# Patient Record
Sex: FEMALE | Race: ASIAN | NOT HISPANIC OR LATINO
[De-identification: names, ages, dates, MRNs, and addresses within clinical notes are randomized per-mention and may not be internally consistent; named-entity substitution may affect disease eponyms.]

---

## 2017-01-20 ENCOUNTER — APPOINTMENT (OUTPATIENT)
Dept: INTERNAL MEDICINE | Facility: CLINIC | Age: 47
End: 2017-01-20

## 2017-01-20 VITALS
WEIGHT: 173 LBS | TEMPERATURE: 98.6 F | HEART RATE: 98 BPM | DIASTOLIC BLOOD PRESSURE: 78 MMHG | BODY MASS INDEX: 30.65 KG/M2 | RESPIRATION RATE: 16 BRPM | HEIGHT: 63 IN | SYSTOLIC BLOOD PRESSURE: 130 MMHG

## 2017-02-05 ENCOUNTER — RX RENEWAL (OUTPATIENT)
Age: 47
End: 2017-02-05

## 2017-02-05 LAB
25(OH)D3 SERPL-MCNC: 16.9 NG/ML
ALBUMIN SERPL ELPH-MCNC: 4.5 G/DL
ALP BLD-CCNC: 62 U/L
ALT SERPL-CCNC: 24 U/L
ANION GAP SERPL CALC-SCNC: 16 MMOL/L
APPEARANCE: CLEAR
AST SERPL-CCNC: 20 U/L
BASOPHILS # BLD AUTO: 0.05 K/UL
BASOPHILS NFR BLD AUTO: 0.7 %
BILIRUB SERPL-MCNC: 0.3 MG/DL
BILIRUBIN URINE: NEGATIVE
BLOOD URINE: NEGATIVE
BUN SERPL-MCNC: 7 MG/DL
CALCIUM SERPL-MCNC: 10 MG/DL
CHLORIDE SERPL-SCNC: 94 MMOL/L
CHOLEST SERPL-MCNC: 181 MG/DL
CHOLEST/HDLC SERPL: 5.5 RATIO
CO2 SERPL-SCNC: 23 MMOL/L
COLOR: YELLOW
CREAT SERPL-MCNC: 0.53 MG/DL
CREAT SPEC-SCNC: 59 MG/DL
EOSINOPHIL # BLD AUTO: 0.67 K/UL
EOSINOPHIL NFR BLD AUTO: 8.8 %
ERYTHROCYTE [SEDIMENTATION RATE] IN BLOOD BY WESTERGREN METHOD: 30 MM/HR
FOLATE SERPL-MCNC: 10.9 NG/ML
GGT SERPL-CCNC: 30 U/L
GLUCOSE QUALITATIVE U: 1000 MG/DL
GLUCOSE SERPL-MCNC: 313 MG/DL
HBA1C MFR BLD HPLC: 12.2 %
HCT VFR BLD CALC: 42.6 %
HDLC SERPL-MCNC: 33 MG/DL
HGB BLD-MCNC: 13.2 G/DL
IMM GRANULOCYTES NFR BLD AUTO: 0.1 %
IRON SATN MFR SERPL: 25 %
IRON SERPL-MCNC: 102 UG/DL
KETONES URINE: ABNORMAL
LDLC SERPL CALC-MCNC: 101 MG/DL
LEUKOCYTE ESTERASE URINE: NEGATIVE
LYMPHOCYTES # BLD AUTO: 2.7 K/UL
LYMPHOCYTES NFR BLD AUTO: 35.4 %
MAN DIFF?: NORMAL
MCHC RBC-ENTMCNC: 26.9 PG
MCHC RBC-ENTMCNC: 31 GM/DL
MCV RBC AUTO: 86.9 FL
MICROALBUMIN 24H UR DL<=1MG/L-MCNC: 3.5 MG/DL
MICROALBUMIN/CREAT 24H UR-RTO: 60 UG/MG
MONOCYTES # BLD AUTO: 0.29 K/UL
MONOCYTES NFR BLD AUTO: 3.8 %
NEUTROPHILS # BLD AUTO: 3.9 K/UL
NEUTROPHILS NFR BLD AUTO: 51.2 %
NITRITE URINE: NEGATIVE
PH URINE: 5.5
PLATELET # BLD AUTO: 371 K/UL
POTASSIUM SERPL-SCNC: 4.7 MMOL/L
PROT SERPL-MCNC: 7.3 G/DL
PROTEIN URINE: NEGATIVE MG/DL
RBC # BLD: 4.9 M/UL
RBC # FLD: 12.6 %
SODIUM SERPL-SCNC: 133 MMOL/L
SPECIFIC GRAVITY URINE: 1.03
T3 SERPL-MCNC: 116 NG/DL
T4 FREE SERPL-MCNC: 1.2 NG/DL
TIBC SERPL-MCNC: 410 UG/DL
TRIGL SERPL-MCNC: 235 MG/DL
TSH SERPL-ACNC: 2.25 UIU/ML
UIBC SERPL-MCNC: 308 UG/DL
UROBILINOGEN URINE: NORMAL MG/DL
VIT B12 SERPL-MCNC: >2000 PG/ML
WBC # FLD AUTO: 7.62 K/UL

## 2017-03-22 ENCOUNTER — RX RENEWAL (OUTPATIENT)
Age: 47
End: 2017-03-22

## 2017-04-28 ENCOUNTER — RX RENEWAL (OUTPATIENT)
Age: 47
End: 2017-04-28

## 2017-04-30 ENCOUNTER — RX RENEWAL (OUTPATIENT)
Age: 47
End: 2017-04-30

## 2017-05-27 ENCOUNTER — APPOINTMENT (OUTPATIENT)
Dept: INTERNAL MEDICINE | Facility: CLINIC | Age: 47
End: 2017-05-27

## 2017-05-27 VITALS
SYSTOLIC BLOOD PRESSURE: 130 MMHG | HEIGHT: 62.5 IN | OXYGEN SATURATION: 98 % | DIASTOLIC BLOOD PRESSURE: 80 MMHG | RESPIRATION RATE: 19 BRPM | WEIGHT: 173 LBS | BODY MASS INDEX: 31.04 KG/M2 | HEART RATE: 89 BPM | TEMPERATURE: 97.6 F

## 2017-05-27 RX ORDER — AMOXICILLIN 500 MG/1
500 TABLET, FILM COATED ORAL
Qty: 21 | Refills: 0 | Status: DISCONTINUED | COMMUNITY
Start: 2017-05-15 | End: 2017-05-27

## 2017-08-21 ENCOUNTER — RX RENEWAL (OUTPATIENT)
Age: 47
End: 2017-08-21

## 2017-09-16 ENCOUNTER — APPOINTMENT (OUTPATIENT)
Dept: INTERNAL MEDICINE | Facility: CLINIC | Age: 47
End: 2017-09-16

## 2017-11-30 PROBLEM — Z86.2 HISTORY OF IRON DEFICIENCY ANEMIA: Status: RESOLVED | Noted: 2017-11-30 | Resolved: 2017-11-30

## 2017-12-01 ENCOUNTER — APPOINTMENT (OUTPATIENT)
Dept: INTERNAL MEDICINE | Facility: CLINIC | Age: 47
End: 2017-12-01

## 2017-12-01 DIAGNOSIS — Z86.2 PERSONAL HISTORY OF DISEASES OF THE BLOOD AND BLOOD-FORMING ORGANS AND CERTAIN DISORDERS INVOLVING THE IMMUNE MECHANISM: ICD-10-CM

## 2018-01-24 ENCOUNTER — RX RENEWAL (OUTPATIENT)
Age: 48
End: 2018-01-24

## 2018-03-29 ENCOUNTER — RX RENEWAL (OUTPATIENT)
Age: 48
End: 2018-03-29

## 2018-06-07 ENCOUNTER — APPOINTMENT (OUTPATIENT)
Dept: INTERNAL MEDICINE | Facility: CLINIC | Age: 48
End: 2018-06-07
Payer: COMMERCIAL

## 2018-06-07 VITALS
SYSTOLIC BLOOD PRESSURE: 120 MMHG | RESPIRATION RATE: 16 BRPM | OXYGEN SATURATION: 98 % | HEART RATE: 94 BPM | TEMPERATURE: 98.4 F | BODY MASS INDEX: 30.55 KG/M2 | HEIGHT: 62 IN | DIASTOLIC BLOOD PRESSURE: 80 MMHG | WEIGHT: 166 LBS

## 2018-06-07 PROCEDURE — 99214 OFFICE O/P EST MOD 30 MIN: CPT

## 2018-06-07 NOTE — PHYSICAL EXAM
[Comprehensive Foot Exam Normal] : Right and left foot were examined and both feet are normal. No ulcers in either foot. Toes are normal and with full ROM.  Normal tactile sensation with monofilament testing throughout both feet

## 2018-06-07 NOTE — HEALTH RISK ASSESSMENT
[No falls in past year] : Patient reported no falls in the past year [0] : 2) Feeling down, depressed, or hopeless: Not at all (0) [Discussed at today's visit] : Advance Directives Discussed at today's visit [Fully functional (bathing, dressing, toileting, transferring, walking, feeding)] : Fully functional (bathing, dressing, toileting, transferring, walking, feeding) [Fully functional (using the telephone, shopping, preparing meals, housekeeping, doing laundry, using] : Fully functional and needs no help or supervision to perform IADLs (using the telephone, shopping, preparing meals, housekeeping, doing laundry, using transportation, managing medications and managing finances) [] : No [de-identified] : None [[id number]] : 0

## 2018-06-07 NOTE — ASSESSMENT
[FreeTextEntry1] : 48 year old female found to have stable Essential Hypertension, Type 2 Diabetes Mellitus with hyperglycemia,  Iron Deficiency Anemia, Cervical Radiculopathy, Vitamin B12 Deficiency,with the current regimen, diet and life style modifications, as counseled. Prior results reviewed and discussed with the patient during today's examination. Plan as ordered.\par Patient was recommended to follow up with GYN for routine examination, PAP smear and Mammogram, reports will be provided, when ready.\par

## 2018-06-07 NOTE — HISTORY OF PRESENT ILLNESS
[Weight Loss ___ Pounds] : Weight loss of [unfilled] pounds [___ # of attempts] : [unfilled] weight lost attempts [Following Diet Successfully] : Following the diet successfully [___ times per week] : Patient exercises [unfilled] times per week [Following  treatment as advised] : Patient is following  treatment as advised [Action] : Action: patient is following a plan to lose weight [Good understanding] : Patient has a good understanding of disease, goals and obesity follow-up plan [de-identified] : 48 year old  female patient with history of stable Essential Hypertension, Type 2 Diabetes Mellitus with hyperglycemia,  Iron Deficiency Anemia, Cervical Radiculopathy, Vitamin B12 Deficiency, history as stated, presented for follow up examination of Elevated BP/Sugar checked. Patient is compliant with all medications. Denies shortness of breath, chest pain or abdominal pains at this time. ROS as stated.\par

## 2018-06-09 ENCOUNTER — LABORATORY RESULT (OUTPATIENT)
Age: 48
End: 2018-06-09

## 2018-06-10 LAB
25(OH)D3 SERPL-MCNC: 10.3 NG/ML
ALBUMIN SERPL ELPH-MCNC: 4.2 G/DL
ALP BLD-CCNC: 58 U/L
ALT SERPL-CCNC: 34 U/L
ANION GAP SERPL CALC-SCNC: 16 MMOL/L
APPEARANCE: CLEAR
AST SERPL-CCNC: 37 U/L
BASOPHILS # BLD AUTO: 0.02 K/UL
BASOPHILS NFR BLD AUTO: 0.3 %
BILIRUB SERPL-MCNC: 0.3 MG/DL
BILIRUBIN URINE: NEGATIVE
BLOOD URINE: NEGATIVE
BUN SERPL-MCNC: 7 MG/DL
CALCIUM SERPL-MCNC: 9.7 MG/DL
CHLORIDE SERPL-SCNC: 97 MMOL/L
CHOLEST SERPL-MCNC: 176 MG/DL
CHOLEST/HDLC SERPL: 5 RATIO
CO2 SERPL-SCNC: 21 MMOL/L
COLOR: YELLOW
CREAT SERPL-MCNC: 0.61 MG/DL
CREAT SPEC-SCNC: 38 MG/DL
EOSINOPHIL # BLD AUTO: 0.15 K/UL
EOSINOPHIL NFR BLD AUTO: 2.5 %
ERYTHROCYTE [SEDIMENTATION RATE] IN BLOOD BY WESTERGREN METHOD: 25 MM/HR
FOLATE SERPL-MCNC: 15.3 NG/ML
GGT SERPL-CCNC: 34 U/L
GLUCOSE QUALITATIVE U: 1000 MG/DL
GLUCOSE SERPL-MCNC: 257 MG/DL
HBA1C MFR BLD HPLC: 12.3 %
HCT VFR BLD CALC: 42 %
HDLC SERPL-MCNC: 35 MG/DL
HGB BLD-MCNC: 13 G/DL
IMM GRANULOCYTES NFR BLD AUTO: 0.2 %
IRON SATN MFR SERPL: 22 %
IRON SERPL-MCNC: 88 UG/DL
KETONES URINE: NEGATIVE
LDLC SERPL CALC-MCNC: 101 MG/DL
LEUKOCYTE ESTERASE URINE: ABNORMAL
LYMPHOCYTES # BLD AUTO: 2.35 K/UL
LYMPHOCYTES NFR BLD AUTO: 38.7 %
MAN DIFF?: NORMAL
MCHC RBC-ENTMCNC: 27 PG
MCHC RBC-ENTMCNC: 31 GM/DL
MCV RBC AUTO: 87.3 FL
MICROALBUMIN 24H UR DL<=1MG/L-MCNC: 2.8 MG/DL
MICROALBUMIN/CREAT 24H UR-RTO: 74 MG/G
MONOCYTES # BLD AUTO: 0.28 K/UL
MONOCYTES NFR BLD AUTO: 4.6 %
NEUTROPHILS # BLD AUTO: 3.26 K/UL
NEUTROPHILS NFR BLD AUTO: 53.7 %
NITRITE URINE: NEGATIVE
PH URINE: 5
PLATELET # BLD AUTO: 355 K/UL
POTASSIUM SERPL-SCNC: 5.2 MMOL/L
PROT SERPL-MCNC: 7.9 G/DL
PROTEIN URINE: NEGATIVE MG/DL
RBC # BLD: 4.81 M/UL
RBC # FLD: 12.9 %
SODIUM SERPL-SCNC: 134 MMOL/L
SPECIFIC GRAVITY URINE: 1.02
T3 SERPL-MCNC: 133 NG/DL
T4 FREE SERPL-MCNC: 1.4 NG/DL
TIBC SERPL-MCNC: 396 UG/DL
TRIGL SERPL-MCNC: 199 MG/DL
TSH SERPL-ACNC: 1.74 UIU/ML
UIBC SERPL-MCNC: 308 UG/DL
UROBILINOGEN URINE: NEGATIVE MG/DL
VIT B12 SERPL-MCNC: 1011 PG/ML
WBC # FLD AUTO: 6.07 K/UL

## 2018-08-13 ENCOUNTER — TRANSCRIPTION ENCOUNTER (OUTPATIENT)
Age: 48
End: 2018-08-13

## 2018-09-15 ENCOUNTER — APPOINTMENT (OUTPATIENT)
Dept: INTERNAL MEDICINE | Facility: CLINIC | Age: 48
End: 2018-09-15
Payer: COMMERCIAL

## 2018-09-15 VITALS
TEMPERATURE: 98 F | BODY MASS INDEX: 30.55 KG/M2 | RESPIRATION RATE: 16 BRPM | OXYGEN SATURATION: 96 % | DIASTOLIC BLOOD PRESSURE: 78 MMHG | HEIGHT: 62 IN | WEIGHT: 166 LBS | SYSTOLIC BLOOD PRESSURE: 120 MMHG | HEART RATE: 85 BPM

## 2018-09-15 PROCEDURE — 99214 OFFICE O/P EST MOD 30 MIN: CPT

## 2018-09-15 NOTE — HEALTH RISK ASSESSMENT
[No falls in past year] : Patient reported no falls in the past year [0] : 2) Feeling down, depressed, or hopeless: Not at all (0) [Patient declined mammogram] : Patient declined mammogram [Patient declined PAP Smear] : Patient declined PAP Smear [HIV test declined] : HIV test declined [] : No [de-identified] : None [[id number]] : 0

## 2018-09-15 NOTE — ASSESSMENT
[FreeTextEntry1] : 48 year old female found to have stable  Essential Hypertension, Type 2 Diabetes Mellitus with hyperglycemia, Cervical Radiculopathy,  Iron Deficiency Anemia, Vitamin D Deficiency, Vitamin B12 Deficiency,with the current regimen, diet and life style modifications, as counseled. Prior results reviewed and discussed with the patient during today's examination. Plan as ordered.\par

## 2018-09-15 NOTE — HISTORY OF PRESENT ILLNESS
[Patient declined Retinal Exam] : Patient declined Retinal Exam. [de-identified] : 48 year old  female patient with history of stable Essential Hypertension, Type 2 Diabetes Mellitus with hyperglycemia, Cervical Radiculopathy,  Iron Deficiency Anemia, Vitamin D Deficiency, Vitamin B12 Deficiency, history as stated, presented for follow up examination of Elevated BP/Sugar checked. Patient is compliant with all medications. Denies shortness of breath, chest pain or abdominal pains at this time. ROS as stated.\par

## 2018-09-23 ENCOUNTER — CLINICAL ADVICE (OUTPATIENT)
Age: 48
End: 2018-09-23

## 2018-09-23 LAB
ALBUMIN SERPL ELPH-MCNC: 4.1 G/DL
ALP BLD-CCNC: 60 U/L
ALT SERPL-CCNC: 26 U/L
ANION GAP SERPL CALC-SCNC: 16 MMOL/L
AST SERPL-CCNC: 21 U/L
BASOPHILS # BLD AUTO: 0.04 K/UL
BASOPHILS NFR BLD AUTO: 0.5 %
BILIRUB SERPL-MCNC: 0.2 MG/DL
BUN SERPL-MCNC: 7 MG/DL
CALCIUM SERPL-MCNC: 10.2 MG/DL
CHLORIDE SERPL-SCNC: 94 MMOL/L
CHOLEST SERPL-MCNC: 177 MG/DL
CHOLEST/HDLC SERPL: 5.9 RATIO
CO2 SERPL-SCNC: 24 MMOL/L
CREAT SERPL-MCNC: 0.59 MG/DL
EOSINOPHIL # BLD AUTO: 0.19 K/UL
EOSINOPHIL NFR BLD AUTO: 2.4 %
FOLATE SERPL-MCNC: 9.4 NG/ML
GGT SERPL-CCNC: 29 U/L
GLUCOSE SERPL-MCNC: 306 MG/DL
HBA1C MFR BLD HPLC: 12.9 %
HCT VFR BLD CALC: 41.1 %
HDLC SERPL-MCNC: 30 MG/DL
HGB BLD-MCNC: 12.9 G/DL
IMM GRANULOCYTES NFR BLD AUTO: 0.1 %
IRON SATN MFR SERPL: 13 %
IRON SERPL-MCNC: 55 UG/DL
LDLC SERPL CALC-MCNC: 94 MG/DL
LYMPHOCYTES # BLD AUTO: 2.93 K/UL
LYMPHOCYTES NFR BLD AUTO: 37.5 %
MAN DIFF?: NORMAL
MCHC RBC-ENTMCNC: 27.2 PG
MCHC RBC-ENTMCNC: 31.4 GM/DL
MCV RBC AUTO: 86.5 FL
MONOCYTES # BLD AUTO: 0.35 K/UL
MONOCYTES NFR BLD AUTO: 4.5 %
NEUTROPHILS # BLD AUTO: 4.29 K/UL
NEUTROPHILS NFR BLD AUTO: 55 %
PLATELET # BLD AUTO: 369 K/UL
POTASSIUM SERPL-SCNC: 4.9 MMOL/L
PROT SERPL-MCNC: 7.9 G/DL
RBC # BLD: 4.75 M/UL
RBC # FLD: 13.2 %
SODIUM SERPL-SCNC: 134 MMOL/L
TIBC SERPL-MCNC: 414 UG/DL
TRIGL SERPL-MCNC: 267 MG/DL
UIBC SERPL-MCNC: 359 UG/DL
VIT B12 SERPL-MCNC: 661 PG/ML
WBC # FLD AUTO: 7.81 K/UL

## 2018-12-08 ENCOUNTER — APPOINTMENT (OUTPATIENT)
Dept: INTERNAL MEDICINE | Facility: CLINIC | Age: 48
End: 2018-12-08

## 2019-01-28 ENCOUNTER — RX RENEWAL (OUTPATIENT)
Age: 49
End: 2019-01-28

## 2019-02-23 ENCOUNTER — APPOINTMENT (OUTPATIENT)
Dept: INTERNAL MEDICINE | Facility: CLINIC | Age: 49
End: 2019-02-23
Payer: COMMERCIAL

## 2019-02-23 VITALS
HEART RATE: 88 BPM | TEMPERATURE: 97 F | SYSTOLIC BLOOD PRESSURE: 120 MMHG | RESPIRATION RATE: 16 BRPM | OXYGEN SATURATION: 96 % | DIASTOLIC BLOOD PRESSURE: 80 MMHG | HEIGHT: 62 IN | BODY MASS INDEX: 31.28 KG/M2 | WEIGHT: 170 LBS

## 2019-02-23 PROCEDURE — 99214 OFFICE O/P EST MOD 30 MIN: CPT

## 2019-02-23 NOTE — HISTORY OF PRESENT ILLNESS
[de-identified] : 48 year old  female patient with history of stable Essential Hypertension, Type 2 Diabetes Mellitus with hyperglycemia,  Iron Deficiency Anemia, Cervical Radiculopathy, Allergic Dermatitis, Vitamin B12 Deficiency, Vitamin D Deficiency, Microalbuminuria, history as stated, presented for follow up examination of Elevated BP/Sugar checked. Patient is compliant with all medications. Denies shortness of breath, chest pain or abdominal pains at this time. ROS as stated.\par

## 2019-02-23 NOTE — ASSESSMENT
[FreeTextEntry1] : 48 year old female found to have stable Essential Hypertension, Type 2 Diabetes Mellitus with hyperglycemia,  Iron Deficiency Anemia, Cervical Radiculopathy, Allergic Dermatitis, Vitamin B12 Deficiency, Vitamin D Deficiency, Microalbuminuria,with the current regimen, diet and life style modifications, as counseled. Prior results reviewed and discussed with the patient during today's examination. Plan as ordered.\par Patient is refusing all immunizations, in spite of counseling risks and benefits.\par

## 2019-02-25 ENCOUNTER — APPOINTMENT (OUTPATIENT)
Dept: ENDOCRINOLOGY | Facility: CLINIC | Age: 49
End: 2019-02-25

## 2019-04-23 ENCOUNTER — TRANSCRIPTION ENCOUNTER (OUTPATIENT)
Age: 49
End: 2019-04-23

## 2019-05-19 ENCOUNTER — RX RENEWAL (OUTPATIENT)
Age: 49
End: 2019-05-19

## 2019-05-19 RX ORDER — ERGOCALCIFEROL 1.25 MG/1
1.25 MG CAPSULE, LIQUID FILLED ORAL
Qty: 12 | Refills: 3 | Status: ACTIVE | COMMUNITY
Start: 2018-06-10 | End: 1900-01-01

## 2019-05-25 ENCOUNTER — RX RENEWAL (OUTPATIENT)
Age: 49
End: 2019-05-25

## 2019-06-05 ENCOUNTER — APPOINTMENT (OUTPATIENT)
Dept: ENDOCRINOLOGY | Facility: CLINIC | Age: 49
End: 2019-06-05

## 2019-06-06 ENCOUNTER — MOBILE ON CALL (OUTPATIENT)
Age: 49
End: 2019-06-06

## 2019-06-08 ENCOUNTER — NON-APPOINTMENT (OUTPATIENT)
Age: 49
End: 2019-06-08

## 2019-06-08 ENCOUNTER — APPOINTMENT (OUTPATIENT)
Dept: INTERNAL MEDICINE | Facility: CLINIC | Age: 49
End: 2019-06-08
Payer: COMMERCIAL

## 2019-06-08 ENCOUNTER — LABORATORY RESULT (OUTPATIENT)
Age: 49
End: 2019-06-08

## 2019-06-08 VITALS
HEIGHT: 62 IN | SYSTOLIC BLOOD PRESSURE: 130 MMHG | OXYGEN SATURATION: 99 % | DIASTOLIC BLOOD PRESSURE: 80 MMHG | RESPIRATION RATE: 16 BRPM | HEART RATE: 86 BPM | WEIGHT: 164 LBS | TEMPERATURE: 98 F | BODY MASS INDEX: 30.18 KG/M2

## 2019-06-08 PROCEDURE — 93000 ELECTROCARDIOGRAM COMPLETE: CPT

## 2019-06-08 PROCEDURE — 99214 OFFICE O/P EST MOD 30 MIN: CPT | Mod: 25

## 2019-06-08 NOTE — HISTORY OF PRESENT ILLNESS
[de-identified] : 49 year old  female patient with history of stable Essential Hypertension, Type 2 Diabetes Mellitus with hyperglycemia,  Iron Deficiency Anemia, Cervical Radiculopathy, Allergic Dermatitis, Vitamin B12 Deficiency, Vitamin D Deficiency, Microalbuminuria, history as stated, presented for follow up examination of Elevated BP/Sugar checked. Patient is compliant with all medications. Denies shortness of breath, chest pain or abdominal pains at this time. ROS as stated.\par

## 2019-06-08 NOTE — HEALTH RISK ASSESSMENT
[No falls in past year] : Patient reported no falls in the past year [0] : 2) Feeling down, depressed, or hopeless: Not at all (0) [Patient declined colonoscopy] : Patient declined colonoscopy [de-identified] : None [] : No [[id number]] : 0

## 2019-06-08 NOTE — ASSESSMENT
[FreeTextEntry1] : 49 year old female found to have stable Essential Hypertension, Type 2 Diabetes Mellitus with hyperglycemia,  Iron Deficiency Anemia, Cervical Radiculopathy, Allergic Dermatitis, Vitamin B12 Deficiency, Vitamin D Deficiency, Microalbuminuria,with the current regimen, diet and life style modifications, as counseled. Prior results reviewed and discussed with the patient during today's examination. Plan as ordered.\par EKG showed NSR at the rate of 85 BPM, LAD, non-sp ST-T changes noted.\par

## 2019-06-09 LAB
25(OH)D3 SERPL-MCNC: 20.1 NG/ML
ALBUMIN SERPL ELPH-MCNC: 4.4 G/DL
ALP BLD-CCNC: 63 U/L
ALT SERPL-CCNC: 20 U/L
ANION GAP SERPL CALC-SCNC: 16 MMOL/L
APPEARANCE: ABNORMAL
AST SERPL-CCNC: 19 U/L
BASOPHILS # BLD AUTO: 0.04 K/UL
BASOPHILS NFR BLD AUTO: 0.5 %
BILIRUB SERPL-MCNC: 0.2 MG/DL
BILIRUBIN URINE: NEGATIVE
BLOOD URINE: ABNORMAL
BUN SERPL-MCNC: 8 MG/DL
CALCIUM SERPL-MCNC: 10.3 MG/DL
CHLORIDE SERPL-SCNC: 97 MMOL/L
CHOLEST SERPL-MCNC: 164 MG/DL
CHOLEST/HDLC SERPL: 5.1 RATIO
CO2 SERPL-SCNC: 22 MMOL/L
COLOR: YELLOW
CREAT SERPL-MCNC: 0.43 MG/DL
CREAT SPEC-SCNC: 97 MG/DL
EOSINOPHIL # BLD AUTO: 0.22 K/UL
EOSINOPHIL NFR BLD AUTO: 2.8 %
ERYTHROCYTE [SEDIMENTATION RATE] IN BLOOD BY WESTERGREN METHOD: 21 MM/HR
ESTIMATED AVERAGE GLUCOSE: 309 MG/DL
FOLATE SERPL-MCNC: 11.8 NG/ML
GGT SERPL-CCNC: 26 U/L
GLUCOSE QUALITATIVE U: ABNORMAL
GLUCOSE SERPL-MCNC: 289 MG/DL
HBA1C MFR BLD HPLC: 12.4 %
HCT VFR BLD CALC: 43.2 %
HDLC SERPL-MCNC: 32 MG/DL
HGB BLD-MCNC: 13.3 G/DL
IMM GRANULOCYTES NFR BLD AUTO: 0.3 %
IRON SATN MFR SERPL: 15 %
IRON SERPL-MCNC: 67 UG/DL
KETONES URINE: NORMAL
LDLC SERPL CALC-MCNC: 86 MG/DL
LEUKOCYTE ESTERASE URINE: ABNORMAL
LYMPHOCYTES # BLD AUTO: 3.2 K/UL
LYMPHOCYTES NFR BLD AUTO: 40.2 %
MAN DIFF?: NORMAL
MCHC RBC-ENTMCNC: 26.8 PG
MCHC RBC-ENTMCNC: 30.8 GM/DL
MCV RBC AUTO: 86.9 FL
MICROALBUMIN 24H UR DL<=1MG/L-MCNC: 7.3 MG/DL
MICROALBUMIN/CREAT 24H UR-RTO: 75 MG/G
MONOCYTES # BLD AUTO: 0.31 K/UL
MONOCYTES NFR BLD AUTO: 3.9 %
NEUTROPHILS # BLD AUTO: 4.17 K/UL
NEUTROPHILS NFR BLD AUTO: 52.3 %
NITRITE URINE: POSITIVE
PH URINE: 5.5
PLATELET # BLD AUTO: 397 K/UL
POTASSIUM SERPL-SCNC: 4.6 MMOL/L
PROT SERPL-MCNC: 7.6 G/DL
PROTEIN URINE: ABNORMAL
RBC # BLD: 4.97 M/UL
RBC # FLD: 12.5 %
SODIUM SERPL-SCNC: 135 MMOL/L
SPECIFIC GRAVITY URINE: 1.02
T3 SERPL-MCNC: 121 NG/DL
T4 FREE SERPL-MCNC: 1.2 NG/DL
TIBC SERPL-MCNC: 436 UG/DL
TRIGL SERPL-MCNC: 230 MG/DL
TSH SERPL-ACNC: 2.37 UIU/ML
UIBC SERPL-MCNC: 369 UG/DL
UROBILINOGEN URINE: NORMAL
VIT B12 SERPL-MCNC: 800 PG/ML
WBC # FLD AUTO: 7.96 K/UL

## 2019-10-26 ENCOUNTER — APPOINTMENT (OUTPATIENT)
Dept: INTERNAL MEDICINE | Facility: CLINIC | Age: 49
End: 2019-10-26
Payer: COMMERCIAL

## 2019-10-26 VITALS
HEART RATE: 85 BPM | DIASTOLIC BLOOD PRESSURE: 80 MMHG | HEIGHT: 62 IN | OXYGEN SATURATION: 95 % | RESPIRATION RATE: 16 BRPM | SYSTOLIC BLOOD PRESSURE: 120 MMHG | BODY MASS INDEX: 30.36 KG/M2 | WEIGHT: 165 LBS | TEMPERATURE: 98 F

## 2019-10-26 PROCEDURE — 99214 OFFICE O/P EST MOD 30 MIN: CPT

## 2019-10-26 NOTE — HISTORY OF PRESENT ILLNESS
[Patient declined Retinal Exam] : Patient declined Retinal Exam. [de-identified] : 49 year old  female patient with history of stable Essential Hypertension, Type 2 Diabetes Mellitus with hyperglycemia,  Iron Deficiency Anemia, Cervical Radiculopathy, Allergic Dermatitis, Vitamin B12 Deficiency, Vitamin D Deficiency, Microalbuminuria, history as stated, presented for follow up examination. Patient is compliant with all medications. Denies shortness of breath, chest pain or abdominal pains at this time. ROS as stated.\par

## 2019-10-26 NOTE — ASSESSMENT
[FreeTextEntry1] : 49 year old female found to have stable Essential Hypertension, Type 2 Diabetes Mellitus with hyperglycemia,  Iron Deficiency Anemia, Cervical Radiculopathy, Allergic Dermatitis, Vitamin B12 Deficiency, Vitamin D Deficiency, Microalbuminuria,with the current regimen, diet and life style modifications, as counseled. Prior results reviewed and discussed with the patient during today's examination. Plan as ordered.\par

## 2019-10-26 NOTE — HEALTH RISK ASSESSMENT
[No falls in past year] : Patient reported no falls in the past year [No] : No [0] : 2) Feeling down, depressed, or hopeless: Not at all (0) [] : No [de-identified] : None [[id number]] : 0

## 2019-11-23 ENCOUNTER — RX RENEWAL (OUTPATIENT)
Age: 49
End: 2019-11-23

## 2020-02-22 ENCOUNTER — APPOINTMENT (OUTPATIENT)
Dept: INTERNAL MEDICINE | Facility: CLINIC | Age: 50
End: 2020-02-22
Payer: COMMERCIAL

## 2020-02-22 VITALS
SYSTOLIC BLOOD PRESSURE: 134 MMHG | BODY MASS INDEX: 30.36 KG/M2 | RESPIRATION RATE: 16 BRPM | WEIGHT: 165 LBS | TEMPERATURE: 97.9 F | DIASTOLIC BLOOD PRESSURE: 84 MMHG | HEART RATE: 81 BPM | HEIGHT: 62 IN | OXYGEN SATURATION: 97 %

## 2020-02-22 PROCEDURE — 99214 OFFICE O/P EST MOD 30 MIN: CPT

## 2020-02-22 NOTE — HEALTH RISK ASSESSMENT
[No] : No [No falls in past year] : Patient reported no falls in the past year [0] : 2) Feeling down, depressed, or hopeless: Not at all (0) [Patient declined colonoscopy] : Patient declined colonoscopy [de-identified] : None [] : No [[id number]] : 0

## 2020-02-22 NOTE — HISTORY OF PRESENT ILLNESS
[de-identified] : 49 year old  female patient with history of stable Essential Hypertension, Type 2 Diabetes Mellitus with hyperglycemia,  Iron Deficiency Anemia, Cervical Radiculopathy, Allergic Dermatitis, Vitamin B12 Deficiency, Vitamin D Deficiency, Microalbuminuria, history as stated, presented for follow up examination. Patient is compliant with all medications. Denies shortness of breath, chest pain or abdominal pains at this time. ROS as stated.\par

## 2020-02-24 LAB
ALBUMIN SERPL ELPH-MCNC: 4.3 G/DL
ALP BLD-CCNC: 59 U/L
ALT SERPL-CCNC: 19 U/L
ANION GAP SERPL CALC-SCNC: 14 MMOL/L
AST SERPL-CCNC: 15 U/L
BASOPHILS # BLD AUTO: 0.06 K/UL
BASOPHILS NFR BLD AUTO: 0.8 %
BILIRUB SERPL-MCNC: <0.2 MG/DL
BUN SERPL-MCNC: 5 MG/DL
CALCIUM SERPL-MCNC: 9.5 MG/DL
CHLORIDE SERPL-SCNC: 102 MMOL/L
CHOLEST SERPL-MCNC: 168 MG/DL
CHOLEST/HDLC SERPL: 5.8 RATIO
CO2 SERPL-SCNC: 22 MMOL/L
CREAT SERPL-MCNC: 0.4 MG/DL
EOSINOPHIL # BLD AUTO: 0.16 K/UL
EOSINOPHIL NFR BLD AUTO: 2.2 %
ESTIMATED AVERAGE GLUCOSE: 324 MG/DL
GGT SERPL-CCNC: 23 U/L
GLUCOSE SERPL-MCNC: 289 MG/DL
HBA1C MFR BLD HPLC: 12.9 %
HCT VFR BLD CALC: 41.6 %
HDLC SERPL-MCNC: 29 MG/DL
HGB BLD-MCNC: 12.9 G/DL
IMM GRANULOCYTES NFR BLD AUTO: 0.1 %
LDLC SERPL CALC-MCNC: 83 MG/DL
LYMPHOCYTES # BLD AUTO: 2.92 K/UL
LYMPHOCYTES NFR BLD AUTO: 40.4 %
MAN DIFF?: NORMAL
MCHC RBC-ENTMCNC: 26.8 PG
MCHC RBC-ENTMCNC: 31 GM/DL
MCV RBC AUTO: 86.3 FL
MONOCYTES # BLD AUTO: 0.36 K/UL
MONOCYTES NFR BLD AUTO: 5 %
NEUTROPHILS # BLD AUTO: 3.71 K/UL
NEUTROPHILS NFR BLD AUTO: 51.5 %
PLATELET # BLD AUTO: 367 K/UL
POTASSIUM SERPL-SCNC: 4.8 MMOL/L
PROT SERPL-MCNC: 7.1 G/DL
RBC # BLD: 4.82 M/UL
RBC # FLD: 13.2 %
SODIUM SERPL-SCNC: 138 MMOL/L
TRIGL SERPL-MCNC: 283 MG/DL
WBC # FLD AUTO: 7.22 K/UL

## 2020-06-13 ENCOUNTER — APPOINTMENT (OUTPATIENT)
Dept: INTERNAL MEDICINE | Facility: CLINIC | Age: 50
End: 2020-06-13
Payer: COMMERCIAL

## 2020-06-13 ENCOUNTER — LABORATORY RESULT (OUTPATIENT)
Age: 50
End: 2020-06-13

## 2020-06-13 VITALS
SYSTOLIC BLOOD PRESSURE: 123 MMHG | OXYGEN SATURATION: 96 % | RESPIRATION RATE: 16 BRPM | HEART RATE: 84 BPM | BODY MASS INDEX: 29.44 KG/M2 | HEIGHT: 62 IN | TEMPERATURE: 97.9 F | WEIGHT: 160 LBS | DIASTOLIC BLOOD PRESSURE: 83 MMHG

## 2020-06-13 PROCEDURE — 99214 OFFICE O/P EST MOD 30 MIN: CPT

## 2020-06-13 NOTE — HEALTH RISK ASSESSMENT
[No] : In the past 12 months have you used drugs other than those required for medical reasons? No [No falls in past year] : Patient reported no falls in the past year [0] : 2) Feeling down, depressed, or hopeless: Not at all (0) [] : No [de-identified] : None [[id number]] : 0

## 2020-06-13 NOTE — ASSESSMENT
[FreeTextEntry1] : 50 year old female found to have stable Essential Hypertension, Type 2 Diabetes Mellitus with hyperglycemia, Iron Deficiency Anemia, Cervical Radiculopathy, Allergic Dermatitis, Vitamin B12 Deficiency, Vitamin D Deficiency, Microalbuminuria,with the current regimen, diet and life style modifications, as counseled. Prior results reviewed and discussed with the patient during today's examination. Plan as ordered.\par

## 2020-06-13 NOTE — HISTORY OF PRESENT ILLNESS
[de-identified] : 50 year old  female patient with history of stable Essential Hypertension, Type 2 Diabetes Mellitus with hyperglycemia,  Iron Deficiency Anemia, Cervical Radiculopathy, Allergic Dermatitis, Vitamin B12 Deficiency, Vitamin D Deficiency, Microalbuminuria, history as stated, presented for follow up examination. Patient is compliant with all medications. Denies shortness of breath, chest pain or abdominal pains at this time. ROS as stated.\par

## 2020-06-14 LAB
25(OH)D3 SERPL-MCNC: 16.1 NG/ML
ALBUMIN SERPL ELPH-MCNC: 4.5 G/DL
ALP BLD-CCNC: 68 U/L
ALT SERPL-CCNC: 24 U/L
ANION GAP SERPL CALC-SCNC: 17 MMOL/L
APPEARANCE: ABNORMAL
AST SERPL-CCNC: 24 U/L
BASOPHILS # BLD AUTO: 0.06 K/UL
BASOPHILS NFR BLD AUTO: 0.8 %
BILIRUB SERPL-MCNC: 0.3 MG/DL
BILIRUBIN URINE: NEGATIVE
BLOOD URINE: NORMAL
BUN SERPL-MCNC: 7 MG/DL
CALCIUM SERPL-MCNC: 9.9 MG/DL
CHLORIDE SERPL-SCNC: 97 MMOL/L
CHOLEST SERPL-MCNC: 174 MG/DL
CHOLEST/HDLC SERPL: 6 RATIO
CO2 SERPL-SCNC: 22 MMOL/L
COLOR: YELLOW
CREAT SERPL-MCNC: 0.38 MG/DL
CREAT SPEC-SCNC: 85 MG/DL
EOSINOPHIL # BLD AUTO: 0.19 K/UL
EOSINOPHIL NFR BLD AUTO: 2.4 %
ERYTHROCYTE [SEDIMENTATION RATE] IN BLOOD BY WESTERGREN METHOD: 97 MM/HR
ESTIMATED AVERAGE GLUCOSE: 332 MG/DL
FOLATE SERPL-MCNC: 8.5 NG/ML
GGT SERPL-CCNC: 29 U/L
GLUCOSE QUALITATIVE U: ABNORMAL
GLUCOSE SERPL-MCNC: 279 MG/DL
HBA1C MFR BLD HPLC: 13.2 %
HCT VFR BLD CALC: 42.7 %
HDLC SERPL-MCNC: 29 MG/DL
HGB BLD-MCNC: 13.5 G/DL
IMM GRANULOCYTES NFR BLD AUTO: 0.4 %
IRON SATN MFR SERPL: 18 %
IRON SERPL-MCNC: 80 UG/DL
KETONES URINE: ABNORMAL
LDLC SERPL CALC-MCNC: 92 MG/DL
LEUKOCYTE ESTERASE URINE: ABNORMAL
LYMPHOCYTES # BLD AUTO: 3.08 K/UL
LYMPHOCYTES NFR BLD AUTO: 38.6 %
MAN DIFF?: NORMAL
MCHC RBC-ENTMCNC: 26.6 PG
MCHC RBC-ENTMCNC: 31.6 GM/DL
MCV RBC AUTO: 84.1 FL
MICROALBUMIN 24H UR DL<=1MG/L-MCNC: 6.3 MG/DL
MICROALBUMIN/CREAT 24H UR-RTO: 74 MG/G
MONOCYTES # BLD AUTO: 0.43 K/UL
MONOCYTES NFR BLD AUTO: 5.4 %
NEUTROPHILS # BLD AUTO: 4.19 K/UL
NEUTROPHILS NFR BLD AUTO: 52.4 %
NITRITE URINE: POSITIVE
PH URINE: 6
PLATELET # BLD AUTO: 401 K/UL
POTASSIUM SERPL-SCNC: 4.7 MMOL/L
PROT SERPL-MCNC: 7.7 G/DL
PROTEIN URINE: ABNORMAL
RBC # BLD: 5.08 M/UL
RBC # FLD: 13 %
SARS-COV-2 IGG SERPL IA-ACNC: <0.2 RATIO
SARS-COV-2 IGG SERPL QL IA: NEGATIVE
SODIUM SERPL-SCNC: 135 MMOL/L
SPECIFIC GRAVITY URINE: 1.03
T3 SERPL-MCNC: 128 NG/DL
T4 FREE SERPL-MCNC: 1.2 NG/DL
TIBC SERPL-MCNC: 457 UG/DL
TRIGL SERPL-MCNC: 264 MG/DL
TSH SERPL-ACNC: 2.16 UIU/ML
UIBC SERPL-MCNC: 377 UG/DL
UROBILINOGEN URINE: NORMAL
VIT B12 SERPL-MCNC: 360 PG/ML
WBC # FLD AUTO: 7.98 K/UL

## 2020-10-17 ENCOUNTER — APPOINTMENT (OUTPATIENT)
Dept: INTERNAL MEDICINE | Facility: CLINIC | Age: 50
End: 2020-10-17

## 2020-11-16 ENCOUNTER — RX RENEWAL (OUTPATIENT)
Age: 50
End: 2020-11-16

## 2021-01-23 ENCOUNTER — APPOINTMENT (OUTPATIENT)
Dept: INTERNAL MEDICINE | Facility: CLINIC | Age: 51
End: 2021-01-23

## 2021-01-28 ENCOUNTER — NON-APPOINTMENT (OUTPATIENT)
Age: 51
End: 2021-01-28

## 2021-01-28 ENCOUNTER — APPOINTMENT (OUTPATIENT)
Dept: INTERNAL MEDICINE | Facility: CLINIC | Age: 51
End: 2021-01-28
Payer: COMMERCIAL

## 2021-01-28 VITALS
HEIGHT: 62 IN | BODY MASS INDEX: 30.18 KG/M2 | WEIGHT: 164 LBS | HEART RATE: 91 BPM | RESPIRATION RATE: 16 BRPM | SYSTOLIC BLOOD PRESSURE: 166 MMHG | DIASTOLIC BLOOD PRESSURE: 91 MMHG | OXYGEN SATURATION: 98 % | TEMPERATURE: 98.1 F

## 2021-01-28 DIAGNOSIS — E55.9 VITAMIN D DEFICIENCY, UNSPECIFIED: ICD-10-CM

## 2021-01-28 DIAGNOSIS — E53.8 DEFICIENCY OF OTHER SPECIFIED B GROUP VITAMINS: ICD-10-CM

## 2021-01-28 DIAGNOSIS — L23.9 ALLERGIC CONTACT DERMATITIS, UNSPECIFIED CAUSE: ICD-10-CM

## 2021-01-28 PROCEDURE — 99072 ADDL SUPL MATRL&STAF TM PHE: CPT

## 2021-01-28 PROCEDURE — 93000 ELECTROCARDIOGRAM COMPLETE: CPT

## 2021-01-28 PROCEDURE — 99214 OFFICE O/P EST MOD 30 MIN: CPT | Mod: 25

## 2021-01-28 NOTE — HISTORY OF PRESENT ILLNESS
[de-identified] : 50 year old  female patient with history of stable Essential Hypertension, Type 2 Diabetes Mellitus with hyperglycemia, Iron Deficiency Anemia, Cervical Radiculopathy, Allergic Dermatitis, Vitamin B12 Deficiency, Vitamin D Deficiency, Microalbuminuria, history as stated, presented for follow up examination. Patient is compliant with all medications. Denies shortness of breath, chest pain or abdominal pains at this time. ROS as stated.\par

## 2021-01-28 NOTE — HEALTH RISK ASSESSMENT
[No] : In the past 12 months have you used drugs other than those required for medical reasons? No [No falls in past year] : Patient reported no falls in the past year [0] : 2) Feeling down, depressed, or hopeless: Not at all (0) [de-identified] : None [] : No [[id number]] : 0

## 2021-01-28 NOTE — ASSESSMENT
[FreeTextEntry1] : 50 year old female found to have stable Essential Hypertension, Type 2 Diabetes Mellitus with hyperglycemia, Iron Deficiency Anemia, Cervical Radiculopathy, Allergic Dermatitis, Vitamin B12 Deficiency, Vitamin D Deficiency, Microalbuminuria,with the current prescription regimen as recommended, diet and life style modifications, as counseled. Prior results reviewed, interpreted and discussed with the patient during today's examination, as appropriate. Follow up, treatment plan and tests, as ordered.\par EKG showed NSR at the rate of 92 BPM, non-sp ST-T changes noted.\par

## 2021-02-20 ENCOUNTER — LABORATORY RESULT (OUTPATIENT)
Age: 51
End: 2021-02-20

## 2021-02-22 LAB
25(OH)D3 SERPL-MCNC: 25.7 NG/ML
ALBUMIN SERPL ELPH-MCNC: 4.5 G/DL
ALP BLD-CCNC: 65 U/L
ALT SERPL-CCNC: 28 U/L
ANION GAP SERPL CALC-SCNC: 17 MMOL/L
APPEARANCE: ABNORMAL
AST SERPL-CCNC: 21 U/L
BASOPHILS # BLD AUTO: 0.05 K/UL
BASOPHILS NFR BLD AUTO: 0.6 %
BILIRUB SERPL-MCNC: 0.3 MG/DL
BILIRUBIN URINE: NEGATIVE
BLOOD URINE: ABNORMAL
BUN SERPL-MCNC: 7 MG/DL
CALCIUM SERPL-MCNC: 10 MG/DL
CHLORIDE SERPL-SCNC: 96 MMOL/L
CHOLEST SERPL-MCNC: 187 MG/DL
CO2 SERPL-SCNC: 21 MMOL/L
COLOR: YELLOW
CREAT SERPL-MCNC: 0.51 MG/DL
CREAT SPEC-SCNC: 67 MG/DL
EOSINOPHIL # BLD AUTO: 0.16 K/UL
EOSINOPHIL NFR BLD AUTO: 1.9 %
ERYTHROCYTE [SEDIMENTATION RATE] IN BLOOD BY WESTERGREN METHOD: 31 MM/HR
ESTIMATED AVERAGE GLUCOSE: 329 MG/DL
FOLATE SERPL-MCNC: >20 NG/ML
GGT SERPL-CCNC: 31 U/L
GLUCOSE QUALITATIVE U: ABNORMAL
GLUCOSE SERPL-MCNC: 305 MG/DL
HBA1C MFR BLD HPLC: 13.1 %
HCT VFR BLD CALC: 42.7 %
HDLC SERPL-MCNC: 36 MG/DL
HGB BLD-MCNC: 13.2 G/DL
IMM GRANULOCYTES NFR BLD AUTO: 0.2 %
IRON SATN MFR SERPL: 20 %
IRON SERPL-MCNC: 93 UG/DL
KETONES URINE: NORMAL
LDLC SERPL CALC-MCNC: 90 MG/DL
LEUKOCYTE ESTERASE URINE: ABNORMAL
LYMPHOCYTES # BLD AUTO: 3.2 K/UL
LYMPHOCYTES NFR BLD AUTO: 37.6 %
MAN DIFF?: NORMAL
MCHC RBC-ENTMCNC: 26.7 PG
MCHC RBC-ENTMCNC: 30.9 GM/DL
MCV RBC AUTO: 86.4 FL
MICROALBUMIN 24H UR DL<=1MG/L-MCNC: 4.9 MG/DL
MICROALBUMIN/CREAT 24H UR-RTO: 74 MG/G
MONOCYTES # BLD AUTO: 0.41 K/UL
MONOCYTES NFR BLD AUTO: 4.8 %
NEUTROPHILS # BLD AUTO: 4.68 K/UL
NEUTROPHILS NFR BLD AUTO: 54.9 %
NITRITE URINE: POSITIVE
NONHDLC SERPL-MCNC: 151 MG/DL
PH URINE: 5.5
PLATELET # BLD AUTO: 392 K/UL
POTASSIUM SERPL-SCNC: 4.8 MMOL/L
PROT SERPL-MCNC: 7.5 G/DL
PROTEIN URINE: ABNORMAL
RBC # BLD: 4.94 M/UL
RBC # FLD: 13 %
SODIUM SERPL-SCNC: 134 MMOL/L
SPECIFIC GRAVITY URINE: 1.02
T3 SERPL-MCNC: 126 NG/DL
T4 FREE SERPL-MCNC: 1.1 NG/DL
TIBC SERPL-MCNC: 466 UG/DL
TRIGL SERPL-MCNC: 308 MG/DL
TSH SERPL-ACNC: 2.1 UIU/ML
UIBC SERPL-MCNC: 373 UG/DL
UROBILINOGEN URINE: NORMAL
VIT B12 SERPL-MCNC: 438 PG/ML
WBC # FLD AUTO: 8.52 K/UL

## 2021-06-10 ENCOUNTER — APPOINTMENT (OUTPATIENT)
Dept: INTERNAL MEDICINE | Facility: CLINIC | Age: 51
End: 2021-06-10

## 2021-08-08 ENCOUNTER — RX RENEWAL (OUTPATIENT)
Age: 51
End: 2021-08-08

## 2021-09-08 ENCOUNTER — RX RENEWAL (OUTPATIENT)
Age: 51
End: 2021-09-08

## 2021-12-03 ENCOUNTER — RX RENEWAL (OUTPATIENT)
Age: 51
End: 2021-12-03

## 2022-10-31 ENCOUNTER — NON-APPOINTMENT (OUTPATIENT)
Age: 52
End: 2022-10-31

## 2022-10-31 ENCOUNTER — APPOINTMENT (OUTPATIENT)
Dept: INTERNAL MEDICINE | Facility: CLINIC | Age: 52
End: 2022-10-31

## 2022-10-31 VITALS
WEIGHT: 164 LBS | RESPIRATION RATE: 16 BRPM | HEART RATE: 89 BPM | TEMPERATURE: 98 F | SYSTOLIC BLOOD PRESSURE: 147 MMHG | OXYGEN SATURATION: 96 % | DIASTOLIC BLOOD PRESSURE: 81 MMHG | HEIGHT: 62 IN | BODY MASS INDEX: 30.18 KG/M2

## 2022-10-31 PROCEDURE — 93000 ELECTROCARDIOGRAM COMPLETE: CPT

## 2022-10-31 PROCEDURE — 99396 PREV VISIT EST AGE 40-64: CPT | Mod: 25

## 2022-10-31 NOTE — HEALTH RISK ASSESSMENT
[Good] : ~his/her~  mood as  good [Never] : Never [No] : In the past 12 months have you used drugs other than those required for medical reasons? No [No falls in past year] : Patient reported no falls in the past year [0] : 2) Feeling down, depressed, or hopeless: Not at all (0) [Patient declined mammogram] : Patient declined mammogram [Patient declined PAP Smear] : Patient declined PAP Smear [Patient declined colonoscopy] : Patient declined colonoscopy [HIV test declined] : HIV test declined [None] : None [Feels Safe at Home] : Feels safe at home [Fully functional (bathing, dressing, toileting, transferring, walking, feeding)] : Fully functional (bathing, dressing, toileting, transferring, walking, feeding) [Fully functional (using the telephone, shopping, preparing meals, housekeeping, doing laundry, using] : Fully functional and needs no help or supervision to perform IADLs (using the telephone, shopping, preparing meals, housekeeping, doing laundry, using transportation, managing medications and managing finances) [Smoke Detector] : smoke detector [Carbon Monoxide Detector] : carbon monoxide detector [Seat Belt] :  uses seat belt [Sunscreen] : uses sunscreen [With Patient/Caregiver] : , with patient/caregiver [FreeTextEntry1] : Check up\par  [de-identified] : None [[id number]] : 0 [Change in mental status noted] : No change in mental status noted [Reports changes in hearing] : Reports no changes in hearing [Reports changes in vision] : Reports no changes in vision [Reports changes in dental health] : Reports no changes in dental health [AdvancecareDate] : 10/22

## 2022-10-31 NOTE — HISTORY OF PRESENT ILLNESS
[de-identified] : 52 year old female patient with history of stable Essential Hypertension, Type 2 Diabetes Mellitus with hyperglycemia, Iron Deficiency Anemia, Cervical Radiculopathy, Allergic Dermatitis, Vitamin B12 Deficiency, Vitamin D Deficiency, Microalbuminuria, history as stated, presented for an annual preventative examination.\par Patient denies any associated symptoms of shortness of breath, chest pain, abdominal pain at this time.\par

## 2022-10-31 NOTE — ASSESSMENT
[FreeTextEntry1] : 52 year old female found to have stable Essential Hypertension, Type 2 Diabetes Mellitus with hyperglycemia, Iron Deficiency Anemia, Cervical Radiculopathy, Allergic Dermatitis, Vitamin B12 Deficiency, Vitamin D Deficiency, Microalbuminuria,with the current prescription regimen as recommended, diet and life style modifications, as counseled. Prior results reviewed, interpreted and discussed with the patient during today's examination, as appropriate. Follow up, treatment plan and tests, as ordered.\par \par EKG:\par Sinus Rhythm @ 87 BPM.\par Known LAD, no new ST-T changes noted.

## 2022-11-05 LAB
25(OH)D3 SERPL-MCNC: 23.2 NG/ML
ALBUMIN SERPL ELPH-MCNC: 4.4 G/DL
ALP BLD-CCNC: 65 U/L
ALT SERPL-CCNC: 25 U/L
ANION GAP SERPL CALC-SCNC: 13 MMOL/L
APPEARANCE: ABNORMAL
AST SERPL-CCNC: 17 U/L
BACTERIA: ABNORMAL
BASOPHILS # BLD AUTO: 0.05 K/UL
BASOPHILS NFR BLD AUTO: 0.6 %
BILIRUB SERPL-MCNC: 0.3 MG/DL
BILIRUBIN URINE: NEGATIVE
BLOOD URINE: ABNORMAL
BUN SERPL-MCNC: 6 MG/DL
CALCIUM SERPL-MCNC: 10.3 MG/DL
CHLORIDE SERPL-SCNC: 97 MMOL/L
CHOLEST SERPL-MCNC: 177 MG/DL
CO2 SERPL-SCNC: 25 MMOL/L
COLOR: YELLOW
CREAT SERPL-MCNC: 0.45 MG/DL
CREAT SPEC-SCNC: 59 MG/DL
EGFR: 115 ML/MIN/1.73M2
EOSINOPHIL # BLD AUTO: 0.55 K/UL
EOSINOPHIL NFR BLD AUTO: 6.4 %
ESTIMATED AVERAGE GLUCOSE: 326 MG/DL
GGT SERPL-CCNC: 28 U/L
GLUCOSE QUALITATIVE U: ABNORMAL
GLUCOSE SERPL-MCNC: 294 MG/DL
HBA1C MFR BLD HPLC: 13 %
HCT VFR BLD CALC: 40 %
HDLC SERPL-MCNC: 34 MG/DL
HGB BLD-MCNC: 12.8 G/DL
HYALINE CASTS: 0 /LPF
IMM GRANULOCYTES NFR BLD AUTO: 0.2 %
IRON SATN MFR SERPL: 17 %
IRON SERPL-MCNC: 73 UG/DL
KETONES URINE: NORMAL
LDLC SERPL CALC-MCNC: 87 MG/DL
LEUKOCYTE ESTERASE URINE: ABNORMAL
LYMPHOCYTES # BLD AUTO: 3.05 K/UL
LYMPHOCYTES NFR BLD AUTO: 35.5 %
MAN DIFF?: NORMAL
MCHC RBC-ENTMCNC: 27.6 PG
MCHC RBC-ENTMCNC: 32 GM/DL
MCV RBC AUTO: 86.2 FL
MICROALBUMIN 24H UR DL<=1MG/L-MCNC: 11.7 MG/DL
MICROALBUMIN/CREAT 24H UR-RTO: 200 MG/G
MICROSCOPIC-UA: NORMAL
MONOCYTES # BLD AUTO: 0.5 K/UL
MONOCYTES NFR BLD AUTO: 5.8 %
NEUTROPHILS # BLD AUTO: 4.42 K/UL
NEUTROPHILS NFR BLD AUTO: 51.5 %
NITRITE URINE: POSITIVE
NONHDLC SERPL-MCNC: 143 MG/DL
PH URINE: 6
PLATELET # BLD AUTO: 368 K/UL
POTASSIUM SERPL-SCNC: 5.1 MMOL/L
PROT SERPL-MCNC: 7.5 G/DL
PROTEIN URINE: ABNORMAL
RBC # BLD: 4.64 M/UL
RBC # FLD: 12.8 %
RED BLOOD CELLS URINE: 10 /HPF
SODIUM SERPL-SCNC: 135 MMOL/L
SPECIFIC GRAVITY URINE: 1.02
SQUAMOUS EPITHELIAL CELLS: 3 /HPF
TIBC SERPL-MCNC: 436 UG/DL
TRIGL SERPL-MCNC: 280 MG/DL
TSH SERPL-ACNC: 2.54 UIU/ML
UIBC SERPL-MCNC: 363 UG/DL
UROBILINOGEN URINE: NORMAL
VIT B12 SERPL-MCNC: 535 PG/ML
WBC # FLD AUTO: 8.59 K/UL
WHITE BLOOD CELLS URINE: >720 /HPF

## 2022-11-30 ENCOUNTER — APPOINTMENT (OUTPATIENT)
Dept: OBGYN | Facility: CLINIC | Age: 52
End: 2022-11-30

## 2022-11-30 VITALS
HEART RATE: 91 BPM | SYSTOLIC BLOOD PRESSURE: 144 MMHG | HEIGHT: 62 IN | DIASTOLIC BLOOD PRESSURE: 84 MMHG | OXYGEN SATURATION: 98 % | TEMPERATURE: 97.5 F | WEIGHT: 162 LBS | BODY MASS INDEX: 29.81 KG/M2

## 2022-11-30 DIAGNOSIS — N89.8 OTHER SPECIFIED NONINFLAMMATORY DISORDERS OF VAGINA: ICD-10-CM

## 2022-11-30 DIAGNOSIS — L29.2 PRURITUS VULVAE: ICD-10-CM

## 2022-11-30 DIAGNOSIS — B37.31 ACUTE CANDIDIASIS OF VULVA AND VAGINA: ICD-10-CM

## 2022-11-30 PROCEDURE — 99204 OFFICE O/P NEW MOD 45 MIN: CPT

## 2022-11-30 RX ORDER — FLUCONAZOLE 150 MG/1
150 TABLET ORAL
Qty: 3 | Refills: 1 | Status: ACTIVE | COMMUNITY
Start: 2022-11-30 | End: 1900-01-01

## 2022-11-30 RX ORDER — CLOTRIMAZOLE AND BETAMETHASONE DIPROPIONATE 10; .5 MG/G; MG/G
1-0.05 CREAM TOPICAL TWICE DAILY
Qty: 1 | Refills: 1 | Status: ACTIVE | COMMUNITY
Start: 2022-11-30 | End: 1900-01-01

## 2022-11-30 NOTE — PHYSICAL EXAM
[Chaperone Present] : A chaperone was present in the examining room during all aspects of the physical examination [Appropriately responsive] : appropriately responsive [Alert] : alert [No Acute Distress] : no acute distress [No Lymphadenopathy] : no lymphadenopathy [Regular Rate Rhythm] : regular rate rhythm [No Murmurs] : no murmurs [Clear to Auscultation B/L] : clear to auscultation bilaterally [Soft] : soft [Non-tender] : non-tender [Non-distended] : non-distended [No HSM] : No HSM [No Lesions] : no lesions [No Mass] : no mass [Oriented x3] : oriented x3 [Examination Of The Breasts] : a normal appearance [Normal] : normal [No Masses] : no breast masses were palpable [Location: ___] : [unfilled]

## 2022-11-30 NOTE — HISTORY OF PRESENT ILLNESS
[Normal Amount/Duration] :  normal amount and duration [Regular Cycle Intervals] : periods have been regular [Menarche Age: ____] : age at menarche was [unfilled] [Menopause Age: ____] : age at menopause was [unfilled] [FreeTextEntry1] : 45yrs [Previously active] : previously active [Men] : men [Vaginal] : vaginal [No] : No

## 2022-12-01 LAB — HPV HIGH+LOW RISK DNA PNL CVX: NOT DETECTED

## 2022-12-09 LAB — CYTOLOGY CVX/VAG DOC THIN PREP: ABNORMAL

## 2023-01-26 ENCOUNTER — NON-APPOINTMENT (OUTPATIENT)
Age: 53
End: 2023-01-26

## 2023-01-30 ENCOUNTER — APPOINTMENT (OUTPATIENT)
Dept: ORTHOPEDIC SURGERY | Facility: CLINIC | Age: 53
End: 2023-01-30
Payer: MEDICAID

## 2023-01-30 VITALS
WEIGHT: 162 LBS | HEIGHT: 62 IN | DIASTOLIC BLOOD PRESSURE: 82 MMHG | HEART RATE: 110 BPM | SYSTOLIC BLOOD PRESSURE: 144 MMHG | BODY MASS INDEX: 29.81 KG/M2

## 2023-01-30 DIAGNOSIS — S82.831A OTHER FRACTURE OF UPPER AND LOWER END OF RIGHT FIBULA, INITIAL ENCOUNTER FOR CLOSED FRACTURE: ICD-10-CM

## 2023-01-30 PROCEDURE — 73610 X-RAY EXAM OF ANKLE: CPT | Mod: RT

## 2023-01-30 PROCEDURE — 99204 OFFICE O/P NEW MOD 45 MIN: CPT

## 2023-02-01 ENCOUNTER — TRANSCRIPTION ENCOUNTER (OUTPATIENT)
Age: 53
End: 2023-02-01

## 2023-02-01 ENCOUNTER — OUTPATIENT (OUTPATIENT)
Dept: OUTPATIENT SERVICES | Facility: HOSPITAL | Age: 53
LOS: 1 days | End: 2023-02-01
Payer: MEDICAID

## 2023-02-01 VITALS
HEART RATE: 89 BPM | SYSTOLIC BLOOD PRESSURE: 122 MMHG | RESPIRATION RATE: 16 BRPM | TEMPERATURE: 98 F | HEIGHT: 63 IN | WEIGHT: 158.07 LBS | DIASTOLIC BLOOD PRESSURE: 79 MMHG | OXYGEN SATURATION: 99 %

## 2023-02-01 DIAGNOSIS — S82.831A OTHER FRACTURE OF UPPER AND LOWER END OF RIGHT FIBULA, INITIAL ENCOUNTER FOR CLOSED FRACTURE: ICD-10-CM

## 2023-02-01 DIAGNOSIS — Z11.52 ENCOUNTER FOR SCREENING FOR COVID-19: ICD-10-CM

## 2023-02-01 DIAGNOSIS — E11.9 TYPE 2 DIABETES MELLITUS WITHOUT COMPLICATIONS: ICD-10-CM

## 2023-02-01 DIAGNOSIS — Z01.818 ENCOUNTER FOR OTHER PREPROCEDURAL EXAMINATION: ICD-10-CM

## 2023-02-01 DIAGNOSIS — I10 ESSENTIAL (PRIMARY) HYPERTENSION: ICD-10-CM

## 2023-02-01 LAB
A1C WITH ESTIMATED AVERAGE GLUCOSE RESULT: 12.4 % — HIGH (ref 4–5.6)
ANION GAP SERPL CALC-SCNC: 14 MMOL/L — SIGNIFICANT CHANGE UP (ref 5–17)
BUN SERPL-MCNC: 8 MG/DL — SIGNIFICANT CHANGE UP (ref 7–23)
CALCIUM SERPL-MCNC: 10.2 MG/DL — SIGNIFICANT CHANGE UP (ref 8.4–10.5)
CHLORIDE SERPL-SCNC: 96 MMOL/L — SIGNIFICANT CHANGE UP (ref 96–108)
CO2 SERPL-SCNC: 24 MMOL/L — SIGNIFICANT CHANGE UP (ref 22–31)
CREAT SERPL-MCNC: 0.39 MG/DL — LOW (ref 0.5–1.3)
EGFR: 119 ML/MIN/1.73M2 — SIGNIFICANT CHANGE UP
ESTIMATED AVERAGE GLUCOSE: 309 MG/DL — HIGH (ref 68–114)
GLUCOSE SERPL-MCNC: 330 MG/DL — HIGH (ref 70–99)
HCT VFR BLD CALC: 40.5 % — SIGNIFICANT CHANGE UP (ref 34.5–45)
HGB BLD-MCNC: 12.8 G/DL — SIGNIFICANT CHANGE UP (ref 11.5–15.5)
MCHC RBC-ENTMCNC: 27.1 PG — SIGNIFICANT CHANGE UP (ref 27–34)
MCHC RBC-ENTMCNC: 31.6 GM/DL — LOW (ref 32–36)
MCV RBC AUTO: 85.6 FL — SIGNIFICANT CHANGE UP (ref 80–100)
NRBC # BLD: 0 /100 WBCS — SIGNIFICANT CHANGE UP (ref 0–0)
PLATELET # BLD AUTO: 424 K/UL — HIGH (ref 150–400)
POTASSIUM SERPL-MCNC: 4.8 MMOL/L — SIGNIFICANT CHANGE UP (ref 3.5–5.3)
POTASSIUM SERPL-SCNC: 4.8 MMOL/L — SIGNIFICANT CHANGE UP (ref 3.5–5.3)
RBC # BLD: 4.73 M/UL — SIGNIFICANT CHANGE UP (ref 3.8–5.2)
RBC # FLD: 12.4 % — SIGNIFICANT CHANGE UP (ref 10.3–14.5)
SARS-COV-2 RNA SPEC QL NAA+PROBE: SIGNIFICANT CHANGE UP
SODIUM SERPL-SCNC: 134 MMOL/L — LOW (ref 135–145)
WBC # BLD: 11.46 K/UL — HIGH (ref 3.8–10.5)
WBC # FLD AUTO: 11.46 K/UL — HIGH (ref 3.8–10.5)

## 2023-02-01 PROCEDURE — 85027 COMPLETE CBC AUTOMATED: CPT

## 2023-02-01 PROCEDURE — C9803: CPT

## 2023-02-01 PROCEDURE — U0003: CPT

## 2023-02-01 PROCEDURE — 80048 BASIC METABOLIC PNL TOTAL CA: CPT

## 2023-02-01 PROCEDURE — U0005: CPT

## 2023-02-01 PROCEDURE — G0463: CPT

## 2023-02-01 PROCEDURE — 83036 HEMOGLOBIN GLYCOSYLATED A1C: CPT

## 2023-02-01 NOTE — H&P PST ADULT - ASSESSMENT
Dentures: no dentures, no loose teeth    Mallampati     DASI METS Dentures: no dentures, no loose teeth    Mallampati 2    DASI 7.04 METS - Can walk 2-3 blocks, climb 2 fights of stairs. No chest pain or dyspnea.

## 2023-02-01 NOTE — H&P PST ADULT - PROBLEM SELECTOR PLAN 1
Scheduled for Open reduction and Internal Fixation Right Distal Fibula   PST Labs: cbc, bmp, A1c   DOS: Fingerstick     *Covid swab performed today at [facility] 02/01/2023

## 2023-02-01 NOTE — H&P PST ADULT - NSICDXPASTMEDICALHX_GEN_ALL_CORE_FT
PAST MEDICAL HISTORY:  DM (diabetes mellitus), type 2     Hypertension     Iron deficiency anemia     Unspecified background retinopathy

## 2023-02-01 NOTE — H&P PST ADULT - HISTORY OF PRESENT ILLNESS
51 y/o F with pmhx of 01/25/2022 53 y/o F with pmhx of HTN, DM2, Iron Deficiency Anemia, Diabetic Nephropathy, presents to [facility] for evaluation. Patient reports she twisted right ankle while walking down the stairs on 01/25/2023. She was diagnosed with closed right ankle fracture. She is scheduled for ORIF right ankle tomorrow morning with Dr. Cardenas. Julio most recent A1c 13% in 11/3/2022.      *Covid Swab Performed today at [facility].

## 2023-02-01 NOTE — H&P PST ADULT - PROBLEM SELECTOR PLAN 3
Patient took AM dose of her medication prior to coming to PST. Advised to stop evening doses of medicaitons

## 2023-02-01 NOTE — H&P PST ADULT - NSANTHOSAYNRD_GEN_A_CORE
No. GOPAL screening performed.  STOP BANG Legend: 0-2 = LOW Risk; 3-4 = INTERMEDIATE Risk; 5-8 = HIGH Risk

## 2023-02-02 ENCOUNTER — TRANSCRIPTION ENCOUNTER (OUTPATIENT)
Age: 53
End: 2023-02-02

## 2023-02-02 ENCOUNTER — OUTPATIENT (OUTPATIENT)
Dept: INPATIENT UNIT | Facility: HOSPITAL | Age: 53
LOS: 1 days | End: 2023-02-02
Payer: MEDICAID

## 2023-02-02 ENCOUNTER — APPOINTMENT (OUTPATIENT)
Dept: ORTHOPEDIC SURGERY | Facility: HOSPITAL | Age: 53
End: 2023-02-02

## 2023-02-02 VITALS
RESPIRATION RATE: 16 BRPM | WEIGHT: 158.07 LBS | DIASTOLIC BLOOD PRESSURE: 77 MMHG | HEART RATE: 93 BPM | TEMPERATURE: 98 F | HEIGHT: 63 IN | OXYGEN SATURATION: 98 % | SYSTOLIC BLOOD PRESSURE: 123 MMHG

## 2023-02-02 VITALS
RESPIRATION RATE: 18 BRPM | HEART RATE: 92 BPM | TEMPERATURE: 98 F | SYSTOLIC BLOOD PRESSURE: 137 MMHG | DIASTOLIC BLOOD PRESSURE: 85 MMHG | OXYGEN SATURATION: 96 %

## 2023-02-02 DIAGNOSIS — E11.65 TYPE 2 DIABETES MELLITUS WITH HYPERGLYCEMIA: ICD-10-CM

## 2023-02-02 DIAGNOSIS — E78.5 HYPERLIPIDEMIA, UNSPECIFIED: ICD-10-CM

## 2023-02-02 DIAGNOSIS — S82.831A OTHER FRACTURE OF UPPER AND LOWER END OF RIGHT FIBULA, INITIAL ENCOUNTER FOR CLOSED FRACTURE: ICD-10-CM

## 2023-02-02 DIAGNOSIS — I10 ESSENTIAL (PRIMARY) HYPERTENSION: ICD-10-CM

## 2023-02-02 LAB
BASE EXCESS BLDV CALC-SCNC: 2.2 MMOL/L — SIGNIFICANT CHANGE UP (ref -2–3)
BLOOD GAS VENOUS - CREATININE: SIGNIFICANT CHANGE UP MG/DL (ref 0.5–1.3)
CA-I SERPL-SCNC: 1.2 MMOL/L — SIGNIFICANT CHANGE UP (ref 1.15–1.33)
CHLORIDE BLDV-SCNC: 99 MMOL/L — SIGNIFICANT CHANGE UP (ref 96–108)
CO2 BLDV-SCNC: 30 MMOL/L — HIGH (ref 22–26)
GAS PNL BLDV: 133 MMOL/L — LOW (ref 136–145)
GAS PNL BLDV: SIGNIFICANT CHANGE UP
GAS PNL BLDV: SIGNIFICANT CHANGE UP
GLUCOSE BLDC GLUCOMTR-MCNC: 293 MG/DL — HIGH (ref 70–99)
GLUCOSE BLDC GLUCOMTR-MCNC: 303 MG/DL — HIGH (ref 70–99)
GLUCOSE BLDC GLUCOMTR-MCNC: 304 MG/DL — HIGH (ref 70–99)
GLUCOSE BLDC GLUCOMTR-MCNC: 331 MG/DL — HIGH (ref 70–99)
GLUCOSE BLDC GLUCOMTR-MCNC: 333 MG/DL — HIGH (ref 70–99)
GLUCOSE BLDC GLUCOMTR-MCNC: 377 MG/DL — HIGH (ref 70–99)
GLUCOSE BLDV-MCNC: 343 MG/DL — HIGH (ref 70–99)
HCG UR QL: NEGATIVE — SIGNIFICANT CHANGE UP
HCO3 BLDV-SCNC: 28 MMOL/L — SIGNIFICANT CHANGE UP (ref 22–29)
HCT VFR BLDA CALC: 38 % — SIGNIFICANT CHANGE UP (ref 34.5–46.5)
HGB BLD CALC-MCNC: 12.8 G/DL — SIGNIFICANT CHANGE UP (ref 11.7–16.1)
LACTATE BLDV-MCNC: 2.6 MMOL/L — HIGH (ref 0.5–2)
PCO2 BLDV: 49 MMHG — HIGH (ref 39–42)
PH BLDV: 7.37 — SIGNIFICANT CHANGE UP (ref 7.32–7.43)
PO2 BLDV: 45 MMHG — SIGNIFICANT CHANGE UP (ref 25–45)
POTASSIUM BLDV-SCNC: 3.8 MMOL/L — SIGNIFICANT CHANGE UP (ref 3.5–5.1)
SAO2 % BLDV: 78 % — SIGNIFICANT CHANGE UP (ref 67–88)

## 2023-02-02 PROCEDURE — 82803 BLOOD GASES ANY COMBINATION: CPT

## 2023-02-02 PROCEDURE — 84295 ASSAY OF SERUM SODIUM: CPT

## 2023-02-02 PROCEDURE — 76000 FLUOROSCOPY <1 HR PHYS/QHP: CPT

## 2023-02-02 PROCEDURE — 99205 OFFICE O/P NEW HI 60 MIN: CPT

## 2023-02-02 PROCEDURE — 83605 ASSAY OF LACTIC ACID: CPT

## 2023-02-02 PROCEDURE — 27829 TREAT LOWER LEG JOINT: CPT | Mod: RT

## 2023-02-02 PROCEDURE — C1713: CPT

## 2023-02-02 PROCEDURE — 82330 ASSAY OF CALCIUM: CPT

## 2023-02-02 PROCEDURE — 85018 HEMOGLOBIN: CPT

## 2023-02-02 PROCEDURE — 82435 ASSAY OF BLOOD CHLORIDE: CPT

## 2023-02-02 PROCEDURE — C9399: CPT

## 2023-02-02 PROCEDURE — C1889: CPT

## 2023-02-02 PROCEDURE — 81025 URINE PREGNANCY TEST: CPT

## 2023-02-02 PROCEDURE — 84132 ASSAY OF SERUM POTASSIUM: CPT

## 2023-02-02 PROCEDURE — 27792 TREATMENT OF ANKLE FRACTURE: CPT | Mod: RT

## 2023-02-02 PROCEDURE — 82962 GLUCOSE BLOOD TEST: CPT

## 2023-02-02 PROCEDURE — 82947 ASSAY GLUCOSE BLOOD QUANT: CPT

## 2023-02-02 PROCEDURE — 97161 PT EVAL LOW COMPLEX 20 MIN: CPT

## 2023-02-02 PROCEDURE — 82565 ASSAY OF CREATININE: CPT

## 2023-02-02 PROCEDURE — 85014 HEMATOCRIT: CPT

## 2023-02-02 DEVICE — SCREW CORT S-T 3.5X14MM: Type: IMPLANTABLE DEVICE | Site: RIGHT | Status: FUNCTIONAL

## 2023-02-02 DEVICE — SCREW CORT S-T 3.5X16MM: Type: IMPLANTABLE DEVICE | Site: RIGHT | Status: FUNCTIONAL

## 2023-02-02 DEVICE — SCREW CORT S-T 3.5X12MM: Type: IMPLANTABLE DEVICE | Site: RIGHT | Status: FUNCTIONAL

## 2023-02-02 DEVICE — KIT REPAIR SS TIGHTROPE W/DRV SYNDESMOSIS: Type: IMPLANTABLE DEVICE | Site: RIGHT | Status: FUNCTIONAL

## 2023-02-02 DEVICE — IMPLANTABLE DEVICE: Type: IMPLANTABLE DEVICE | Site: RIGHT | Status: FUNCTIONAL

## 2023-02-02 DEVICE — PLATE 6 HOLE: Type: IMPLANTABLE DEVICE | Site: RIGHT | Status: FUNCTIONAL

## 2023-02-02 RX ORDER — INSULIN LISPRO 100/ML
VIAL (ML) SUBCUTANEOUS
Refills: 0 | Status: DISCONTINUED | OUTPATIENT
Start: 2023-02-02 | End: 2023-02-16

## 2023-02-02 RX ORDER — SODIUM CHLORIDE 9 MG/ML
1000 INJECTION, SOLUTION INTRAVENOUS
Refills: 0 | Status: DISCONTINUED | OUTPATIENT
Start: 2023-02-02 | End: 2023-02-16

## 2023-02-02 RX ORDER — MULTIVIT-MIN/FERROUS GLUCONATE 9 MG/15 ML
1 LIQUID (ML) ORAL
Qty: 0 | Refills: 0 | DISCHARGE

## 2023-02-02 RX ORDER — FAMOTIDINE 10 MG/ML
20 INJECTION INTRAVENOUS ONCE
Refills: 0 | Status: COMPLETED | OUTPATIENT
Start: 2023-02-02 | End: 2023-02-02

## 2023-02-02 RX ORDER — INSULIN GLARGINE 100 [IU]/ML
15 INJECTION, SOLUTION SUBCUTANEOUS ONCE
Refills: 0 | Status: COMPLETED | OUTPATIENT
Start: 2023-02-02 | End: 2023-02-02

## 2023-02-02 RX ORDER — DEXTROSE 50 % IN WATER 50 %
25 SYRINGE (ML) INTRAVENOUS ONCE
Refills: 0 | Status: DISCONTINUED | OUTPATIENT
Start: 2023-02-02 | End: 2023-02-16

## 2023-02-02 RX ORDER — LOSARTAN POTASSIUM 100 MG/1
1 TABLET, FILM COATED ORAL
Qty: 0 | Refills: 0 | DISCHARGE

## 2023-02-02 RX ORDER — INSULIN LISPRO 100/ML
4 VIAL (ML) SUBCUTANEOUS
Refills: 0 | Status: DISCONTINUED | OUTPATIENT
Start: 2023-02-02 | End: 2023-02-16

## 2023-02-02 RX ORDER — INSULIN GLARGINE 100 [IU]/ML
15 INJECTION, SOLUTION SUBCUTANEOUS AT BEDTIME
Refills: 0 | Status: DISCONTINUED | OUTPATIENT
Start: 2023-02-02 | End: 2023-02-16

## 2023-02-02 RX ORDER — GLIMEPIRIDE 1 MG
1 TABLET ORAL
Qty: 0 | Refills: 0 | DISCHARGE

## 2023-02-02 RX ORDER — SODIUM CHLORIDE 9 MG/ML
1000 INJECTION, SOLUTION INTRAVENOUS
Refills: 0 | Status: DISCONTINUED | OUTPATIENT
Start: 2023-02-02 | End: 2023-02-02

## 2023-02-02 RX ORDER — ONDANSETRON 8 MG/1
4 TABLET, FILM COATED ORAL ONCE
Refills: 0 | Status: DISCONTINUED | OUTPATIENT
Start: 2023-02-02 | End: 2023-02-02

## 2023-02-02 RX ORDER — DEXTROSE 50 % IN WATER 50 %
15 SYRINGE (ML) INTRAVENOUS ONCE
Refills: 0 | Status: DISCONTINUED | OUTPATIENT
Start: 2023-02-02 | End: 2023-02-16

## 2023-02-02 RX ORDER — ISOPROPYL ALCOHOL, BENZOCAINE .7; .06 ML/ML; ML/ML
1 SWAB TOPICAL
Qty: 25 | Refills: 1
Start: 2023-02-02 | End: 2023-03-23

## 2023-02-02 RX ORDER — ASPIRIN/CALCIUM CARB/MAGNESIUM 324 MG
1 TABLET ORAL
Qty: 60 | Refills: 0
Start: 2023-02-02 | End: 2023-03-03

## 2023-02-02 RX ORDER — OXYCODONE AND ACETAMINOPHEN 5; 325 MG/1; MG/1
1 TABLET ORAL
Qty: 30 | Refills: 0
Start: 2023-02-02 | End: 2023-02-06

## 2023-02-02 RX ORDER — GLUCAGON INJECTION, SOLUTION 0.5 MG/.1ML
1 INJECTION, SOLUTION SUBCUTANEOUS ONCE
Refills: 0 | Status: DISCONTINUED | OUTPATIENT
Start: 2023-02-02 | End: 2023-02-16

## 2023-02-02 RX ORDER — METFORMIN HYDROCHLORIDE 850 MG/1
1 TABLET ORAL
Qty: 0 | Refills: 0 | DISCHARGE

## 2023-02-02 RX ORDER — OXYCODONE HYDROCHLORIDE 5 MG/1
5 TABLET ORAL EVERY 6 HOURS
Refills: 0 | Status: DISCONTINUED | OUTPATIENT
Start: 2023-02-02 | End: 2023-02-02

## 2023-02-02 RX ORDER — INSULIN GLARGINE 100 [IU]/ML
15 INJECTION, SOLUTION SUBCUTANEOUS
Qty: 15 | Refills: 1
Start: 2023-02-02 | End: 2023-04-02

## 2023-02-02 RX ORDER — CEFAZOLIN SODIUM 1 G
2000 VIAL (EA) INJECTION EVERY 6 HOURS
Refills: 0 | Status: COMPLETED | OUTPATIENT
Start: 2023-02-02 | End: 2023-02-02

## 2023-02-02 RX ORDER — DEXTROSE 50 % IN WATER 50 %
12.5 SYRINGE (ML) INTRAVENOUS ONCE
Refills: 0 | Status: DISCONTINUED | OUTPATIENT
Start: 2023-02-02 | End: 2023-02-16

## 2023-02-02 RX ORDER — CHLORHEXIDINE GLUCONATE 213 G/1000ML
1 SOLUTION TOPICAL ONCE
Refills: 0 | Status: DISCONTINUED | OUTPATIENT
Start: 2023-02-02 | End: 2023-02-02

## 2023-02-02 RX ORDER — GABAPENTIN 400 MG/1
1 CAPSULE ORAL
Qty: 0 | Refills: 0 | DISCHARGE

## 2023-02-02 RX ORDER — INSULIN HUMAN 100 [IU]/ML
4 INJECTION, SOLUTION SUBCUTANEOUS ONCE
Refills: 0 | Status: COMPLETED | OUTPATIENT
Start: 2023-02-02 | End: 2023-02-02

## 2023-02-02 RX ORDER — INSULIN LISPRO 100/ML
VIAL (ML) SUBCUTANEOUS
Refills: 0 | Status: DISCONTINUED | OUTPATIENT
Start: 2023-02-02 | End: 2023-02-02

## 2023-02-02 RX ORDER — HYDROMORPHONE HYDROCHLORIDE 2 MG/ML
0.5 INJECTION INTRAMUSCULAR; INTRAVENOUS; SUBCUTANEOUS
Refills: 0 | Status: DISCONTINUED | OUTPATIENT
Start: 2023-02-02 | End: 2023-02-02

## 2023-02-02 RX ORDER — SODIUM CHLORIDE 9 MG/ML
3 INJECTION INTRAMUSCULAR; INTRAVENOUS; SUBCUTANEOUS EVERY 8 HOURS
Refills: 0 | Status: DISCONTINUED | OUTPATIENT
Start: 2023-02-02 | End: 2023-02-02

## 2023-02-02 RX ORDER — CEFAZOLIN SODIUM 1 G
2000 VIAL (EA) INJECTION ONCE
Refills: 0 | Status: COMPLETED | OUTPATIENT
Start: 2023-02-02 | End: 2023-02-02

## 2023-02-02 RX ORDER — INSULIN LISPRO 100/ML
VIAL (ML) SUBCUTANEOUS AT BEDTIME
Refills: 0 | Status: DISCONTINUED | OUTPATIENT
Start: 2023-02-02 | End: 2023-02-16

## 2023-02-02 RX ORDER — INSULIN LISPRO 100/ML
VIAL (ML) SUBCUTANEOUS AT BEDTIME
Refills: 0 | Status: DISCONTINUED | OUTPATIENT
Start: 2023-02-02 | End: 2023-02-02

## 2023-02-02 RX ORDER — LIDOCAINE HCL 20 MG/ML
0.2 VIAL (ML) INJECTION ONCE
Refills: 0 | Status: DISCONTINUED | OUTPATIENT
Start: 2023-02-02 | End: 2023-02-02

## 2023-02-02 RX ADMIN — Medication 3: at 15:00

## 2023-02-02 RX ADMIN — INSULIN GLARGINE 15 UNIT(S): 100 INJECTION, SOLUTION SUBCUTANEOUS at 17:40

## 2023-02-02 RX ADMIN — Medication 8: at 10:43

## 2023-02-02 RX ADMIN — Medication 4 UNIT(S): at 15:00

## 2023-02-02 RX ADMIN — FAMOTIDINE 20 MILLIGRAM(S): 10 INJECTION INTRAVENOUS at 16:21

## 2023-02-02 RX ADMIN — SODIUM CHLORIDE 3 MILLILITER(S): 9 INJECTION INTRAMUSCULAR; INTRAVENOUS; SUBCUTANEOUS at 07:22

## 2023-02-02 RX ADMIN — SODIUM CHLORIDE 75 MILLILITER(S): 9 INJECTION, SOLUTION INTRAVENOUS at 10:51

## 2023-02-02 RX ADMIN — INSULIN HUMAN 4 UNIT(S): 100 INJECTION, SOLUTION SUBCUTANEOUS at 07:30

## 2023-02-02 RX ADMIN — Medication 100 MILLIGRAM(S): at 15:00

## 2023-02-02 NOTE — ASU DISCHARGE PLAN (ADULT/PEDIATRIC) - NS MD DC FALL RISK RISK
For information on Fall & Injury Prevention, visit: [email or web address] OR  [email or web address] OR  https://www.cdc.gov/steadi/patient.html

## 2023-02-02 NOTE — ASU DISCHARGE PLAN (ADULT/PEDIATRIC) - PROCEDURE
Right ankle open reduction internal fixation Right ankle open reduction internal fixation surgical repair R ankle

## 2023-02-02 NOTE — CONSULT NOTE ADULT - SUBJECTIVE AND OBJECTIVE BOX
HPI:  53 y/o F w/ hx of Type 2 DM x 7 years awaiting appointment with endocrine in March. Complicated by neuropathy on gabapentin. No other reported microvascular complications. At home on metformin 1000mg BID, Glimepiride 4mg BID, and Glipizide 20mg in the morning and 10mg in the evening. Does not check glucose. No reported hypoglycemia or symptoms of hypoglycemia. Drinks ginger ale on occasion. Also eats roti. Diet mostly vegetarian. No juices. Denies polyuria, polydipsia, nausea or vomiting. No family hx of diabetes. Father with hx of prediabetes.      PAST MEDICAL & SURGICAL HISTORY:  DM (diabetes mellitus), type 2      Unspecified background retinopathy      Iron deficiency anemia      Hypertension      No significant past surgical history          FAMILY HISTORY: Father- Prediabetes      Social History: No tobacco or alcohol use    Outpatient Medications:  Metformin 1000mg BID  Glimepiride 4mg BID  Glipizide 20mg in the morning and 10mg in the evening.     MEDICATIONS  (STANDING):  ceFAZolin   IVPB 2000 milliGRAM(s) IV Intermittent every 6 hours  dextrose 5%. 1000 milliLiter(s) (50 mL/Hr) IV Continuous <Continuous>  dextrose 5%. 1000 milliLiter(s) (100 mL/Hr) IV Continuous <Continuous>  dextrose 50% Injectable 25 Gram(s) IV Push once  dextrose 50% Injectable 12.5 Gram(s) IV Push once  dextrose 50% Injectable 25 Gram(s) IV Push once  glucagon  Injectable 1 milliGRAM(s) IntraMuscular once  insulin glargine Injectable (LANTUS) 15 Unit(s) SubCutaneous once  insulin glargine Injectable (LANTUS) 15 Unit(s) SubCutaneous at bedtime  insulin lispro (ADMELOG) corrective regimen sliding scale   SubCutaneous three times a day before meals  insulin lispro (ADMELOG) corrective regimen sliding scale   SubCutaneous at bedtime  insulin lispro Injectable (ADMELOG) 4 Unit(s) SubCutaneous three times a day before meals    MEDICATIONS  (PRN):  dextrose Oral Gel 15 Gram(s) Oral once PRN Blood Glucose LESS THAN 70 milliGRAM(s)/deciliter  HYDROmorphone  Injectable 0.5 milliGRAM(s) IV Push every 10 minutes PRN Moderate Pain (4 - 6)  ondansetron Injectable 4 milliGRAM(s) IV Push once PRN Nausea and/or Vomiting  oxyCODONE    IR 5 milliGRAM(s) Oral every 6 hours PRN Severe Pain (7 - 10)      Allergies    No Known Allergies    Intolerances      Review of Systems:  Constitutional: No fever, No change in weight  Eyes: No blurry vision  Neuro: No headache, No paresthesias  HEENT: No throat pain  Cardiovascular: No chest pain  Respiratory: No SOB  GI: No nausea or vomiting  : No polyuria  Skin: no rash  Psych: no depression  Endocrine: No polydipsia, No heat or cold intolerance, rest as noted in HPI  Hem/lymph: no swelling    All other review of systems negative      PHYSICAL EXAM:  VITALS: T(C): 36.5 (02-02-23 @ 16:35)  T(F): 97.7 (02-02-23 @ 16:35), Max: 98.1 (02-02-23 @ 06:24)  HR: 92 (02-02-23 @ 16:35) (86 - 96)  BP: 137/85 (02-02-23 @ 16:35) (123/77 - 173/78)  RR:  (14 - 18)  SpO2:  (95% - 99%)  Wt(kg): --  GENERAL: NAD at this time  EYES: No proptosis, EOMI  HEENT:  Atraumatic, Normocephalic,   THYROID: Normal size, no palpable nodules  RESPIRATORY: Clear to auscultation bilaterally, full excursion, non-labored  CARDIOVASCULAR: Regular rhythm; No murmurs; no peripheral edema  GI: Soft, nontender, non distended, normal bowel sounds  SKIN: Dry, intact  MUSCULOSKELETAL: decreased ROM on right LE with dressing and splint in place  NEURO: follows commands,   PSYCH: Alert and oriented x 3, normal affect, normal mood  CUSHING'S SIGNS: no striae      POCT Blood Glucose.: 303 mg/dL (02-02-23 @ 16:47)  POCT Blood Glucose.: 293 mg/dL (02-02-23 @ 14:52)  POCT Blood Glucose.: 304 mg/dL (02-02-23 @ 10:35)  POCT Blood Glucose.: 333 mg/dL (02-02-23 @ 07:52)  POCT Blood Glucose.: 331 mg/dL (02-02-23 @ 07:28)  POCT Blood Glucose.: 377 mg/dL (02-02-23 @ 06:17)                              12.8   11.46 )-----------( 424      ( 01 Feb 2023 15:58 )             40.5       02-01    134<L>  |  96  |  8   ----------------------------<  330<H>  4.8   |  24  |  0.39<L>    eGFR: 119    Ca    10.2      02-01      Thyroid Function Tests:            Radiology:             
ENDOCRINE INITIAL CONSULT - diabetes    HPI:  Ms. Odom is a 53 y/o female with a PMHx of HTN, T2DM, SHAWN who presents for right ankle fracture s/p ORIF.      ENDOCRINE HISTORY   Diagnosed with DM:   Last HbA1c:   Endocrinologist:   Jordan review of medications:  Home DM Meds:  Adherence:  Microvascular complications: Renal, opthalmologic, neuropathy  Macrovascular complications:  SMBG:  Symptoms:  Hypoglycemia episodes:  BG at home:  Diet at home:  Appetite in hospital:  Exercise:  PMHx:  PSHx:  Family hx:  Social hx:  Insurance:  Resides in:      PAST MEDICAL & SURGICAL HISTORY:  DM (diabetes mellitus), type 2      Unspecified background retinopathy      Iron deficiency anemia      Hypertension      No significant past surgical history          FAMILY HISTORY:      Social History:      Home Medications:  Centrum oral tablet: 1 tab(s) orally once a day (02 Feb 2023 07:05)  gabapentin 100 mg oral capsule: 1 tab(s) orally once a day (at bedtime) (02 Feb 2023 07:05)  glimepiride 4 mg oral tablet: 1 tab(s) orally once a day (02 Feb 2023 07:05)  glipiZIDE 10 mg oral tablet: 2 tablets in AM and 1 in PM  (02 Feb 2023 07:05)  losartan 50 mg oral tablet: 1 tab(s) orally once a day (02 Feb 2023 07:05)  metFORMIN 1000 mg oral tablet: 1 tab(s) orally 2 times a day (02 Feb 2023 07:05)      MEDICATIONS  (STANDING):  ceFAZolin   IVPB 2000 milliGRAM(s) IV Intermittent every 6 hours  dextrose 5%. 1000 milliLiter(s) (50 mL/Hr) IV Continuous <Continuous>  dextrose 5%. 1000 milliLiter(s) (100 mL/Hr) IV Continuous <Continuous>  dextrose 50% Injectable 25 Gram(s) IV Push once  dextrose 50% Injectable 12.5 Gram(s) IV Push once  dextrose 50% Injectable 25 Gram(s) IV Push once  glucagon  Injectable 1 milliGRAM(s) IntraMuscular once  insulin lispro (ADMELOG) corrective regimen sliding scale   SubCutaneous three times a day before meals  insulin lispro (ADMELOG) corrective regimen sliding scale   SubCutaneous at bedtime  lactated ringers. 1000 milliLiter(s) (75 mL/Hr) IV Continuous <Continuous>    MEDICATIONS  (PRN):  dextrose Oral Gel 15 Gram(s) Oral once PRN Blood Glucose LESS THAN 70 milliGRAM(s)/deciliter  HYDROmorphone  Injectable 0.5 milliGRAM(s) IV Push every 10 minutes PRN Moderate Pain (4 - 6)  ondansetron Injectable 4 milliGRAM(s) IV Push once PRN Nausea and/or Vomiting      Allergies    No Known Allergies    Intolerances        REVIEW OF SYSTEMS  Constitutional: No fever  Eyes: No blurry vision  Neuro: No tremors  HEENT: No pain  Cardiovascular: No chest pain, palpitations  Respiratory: No SOB, no cough  GI: No nausea, vomiting, abdominal pain  : No dysuria  Skin: no rash  Psych: no depression  Endocrine: no polyuria, polydipsia  Hem/lymph: no swelling  Osteoporosis: no fractures  ALL OTHER SYSTEMS REVIEWED AND NEGATIVE     UNABLE TO OBTAIN     PHYSICAL EXAM   Vital Signs Last 24 Hrs  T(C): 36.2 (02 Feb 2023 11:00), Max: 36.7 (02 Feb 2023 06:24)  T(F): 97.2 (02 Feb 2023 11:00), Max: 98.1 (02 Feb 2023 06:24)  HR: 96 (02 Feb 2023 13:00) (87 - 96)  BP: 155/74 (02 Feb 2023 13:00) (122/79 - 155/74)  BP(mean): 106 (02 Feb 2023 13:00) (89 - 106)  RR: 16 (02 Feb 2023 13:00) (14 - 16)  SpO2: 95% (02 Feb 2023 13:00) (95% - 99%)    Parameters below as of 02 Feb 2023 13:00  Patient On (Oxygen Delivery Method): room air      GENERAL: NAD, well-groomed, well-developed  EYES: No proptosis, no lid lag, anicteric  HEENT:  Atraumatic, Normocephalic, moist mucous membranes  THYROID: Normal size, no palpable nodules  RESPIRATORY: Clear to auscultation bilaterally; No rales, rhonchi, wheezing  CARDIOVASCULAR: Regular rate and rhythm; No murmurs; no peripheral edema  GI: Soft, nontender, non distended, normal bowel sounds  SKIN: Dry, intact, No rashes or lesions  MUSCULOSKELETAL: Full range of motion, normal strength  NEURO: sensation intact, extraocular movements intact, no tremor  PSYCH: Alert and oriented x 3, normal affect, normal mood  CUSHING'S SIGNS: no striae    CAPILLARY BLOOD GLUCOSE      POCT Blood Glucose.: 304 mg/dL (02 Feb 2023 10:35)  POCT Blood Glucose.: 333 mg/dL (02 Feb 2023 07:52)  POCT Blood Glucose.: 331 mg/dL (02 Feb 2023 07:28)  POCT Blood Glucose.: 377 mg/dL (02 Feb 2023 06:17)      A1C with Estimated Average Glucose Result: 12.4 % (02-01-23 @ 15:58)                            12.8   11.46 )-----------( 424      ( 01 Feb 2023 15:58 )             40.5       02-01    134<L>  |  96  |  8   ----------------------------<  330<H>  4.8   |  24  |  0.39<L>    Ca    10.2      01 Feb 2023 15:58            LIPIDS    RADIOLOGY

## 2023-02-02 NOTE — PHYSICAL THERAPY INITIAL EVALUATION ADULT - PHYSICAL ASSIST/NONPHYSICAL ASSIST: BED TO CHAIR, REHAB EVAL
ED PROVIDER NOTE   Date of Service: 1/4/2022   Time Seen: 10:08 PM   Provider: Prabhjot Mallory NP  Supervising Physician: Dr Up    CHIEF COMPLAINT   Chief Complaint   Patient presents with   • Knee Pain        HISTORY OF PRESENT ILLNESS     Old records reviewed :  Past medical history significant for osteoarthritis of both knees, LDH, prosthetic aortic valve, chronic diastolic heart failure, a flutter on warfarin, balance problems, frequent falls, chronic kidney disease.    History obtained from patient and medical record     History limited by:  Nothing     This patient is a 80 year old female presenting to the emergency department with a chief complaint of bilateral knee pain noted after a fall 1 week ago.  Patient states she was in her home a week ago ambulating up stairs when she lost her balance and fell down the stairs injuring her knees.  She states she “hit everything” on the way down.  She denies loss of consciousness.  She has noted a steady worsening of bilateral lower extremity edema associated with her falls but admits that she has not been taking her Lasix as it is difficult for her to ambulate from her bedroom to the bathroom.  She has been able to tolerate ambulating up and down stairs but states it is very difficult for her to tolerate and feels unsafe as she does live alone in a multistory home.  She has denied chest pain, shortness of breath or abdominal pain.  She does request oxygen on arrival to the ED stating she has this ordered p.r.n. due to history of her previous aortic valve replacement and AFib concerns.  She is in no acute respiratory distress.  No fevers or chills.  She also is concerned about her INR as she had a recent supratherapeutic value of 3.4 noted with the most recent check on 12/30/2021.  These results are yet pending.     PAST MEDICAL HISTORY     Past Medical History:   Diagnosis Date   • Acute hypoxemic respiratory failure due to COVID-19 (CMS/[facility]) 12/19/2020   •  Adrenal adenoma 1/25/2019    Overview:  left,incidental finding 9/09 ([facility])   • Allergy     Seasonal & [employer] (respiratory distress)   • Anticoagulation management encounter 6/9/2016 2/18/16: Sev. AS ZITA 0.96 cm².Hyperdynamic LVSF.S/P AVR, mechanical 6/1/16@[facility].  On Coumadin anticoagulation   • Aortic stenosis, severe S/P AVR 10/22/2015    2/18/16: Sev. AS ZITA 0.96 cm².Hyperdynamic LVSF.S/P AVR, mechanical 6/1/16@[facility].  On Coumadin anticoagulation   • Balance problems 11/14/2019   • Benign left Adrenal tumor/nodule     8 x 2.2 cm noted February 2016 .4/26/16Sallieo silvio estrella/ Dr. Watt-no pheochromocytoma   • Carpal tunnel syndrome    • Cataracts, both eyes    • Chronic diastolic heart failure (CMS/HCC) 10/13/2017    02/26/2020 echo:  EF 66%, mechanical prosthetic aortic valve, mild AVR   • Chronic JACKSON (dyspnea on exertion) 2/18/2016   • Degeneration of lumbar or lumbosacral intervertebral disc 9/24/2012    Overview:  Chronic left low back pain, with a left lumbar radiculopathy, since 2011.  Diagnostic Studies: Lumbar MRI 7/5/12: The patient has a grade II anterolisthesis of L4 onto L5, with advanced degenerative changes of the disk space with irregularities of the endplates and benign marrow changes. There is severe narrowing of the spinal canal with severe narrowing of the neural foramen on both si   • Depressive disorder    • Disc disease, degenerative, cervical    • Diverticulosis    • Diverticulosis 1/25/2019 4/12/19:LGIB -Hgb was 8.3 ,7.4.S/P1 unit of PRBC .4/16/19 EGD:normal.TC:Hemorrhoids,TIC, tubular adenoma polypectomy-Dr. Vazquez->Prob.TIC bleed on Coumadin.   • Dysphoric mood 6/12/2017   • Essential (primary) hypertension    • Exertional dyspnea 2/18/2016   • Frequent falls 11/14/2019   • GERD (gastroesophageal reflux disease)    • GI bleed 8/21/2020   • Hallux valgus, acquired 12/12/2013    Overview:  IMO update   • Hemorrhoids 8/21/2020 4/12/19:LGIB -Hgb was 8.3 ,7.4.S/P1  unit of PRBC .4/16/19 EGD:normal.TC:Hemorrhoids,TIC, tubular adenoma polypectomy-Dr. Vazquez->Prob.TIC bleed on Coumadin.   • Hiatal hernia    • History of artificial heart valve/S/P AVR 6/1/2016    @[facility]   • History of lower GI bleeding 8/21/2020 4/12/19:LGIB -Hgb was 8.3 ,7.4.S/P1 unit of PRBC .4/16/19 EGD:normal.TC:Hemorrhoids,TIC, tubular adenoma polypectomy-Dr. Vazquez->Prob.TIC bleed on Coumadin.   • Hx Anemia     3./13,/16.  H/H 12.9/41   • Hx Atrial flutter (CMS/HCC) 7/10/2016   • Hx Iron deficiency anemia due to chronic blood loss 10/4/2018    09/23/2019 H/H 14.8/47.1   • IBS (irritable bowel syndrome)    • Insomnia    • Keratoderma    • Long term (current) use of anticoagulants [Z79.01]-Coumadin 2/26/2019    S/P mechanical prostethic AVR  INR goal between 2.5-3.0   • Lumbosacral spondylosis without myelopathy 4/2/2015    S/P MORENA , Dr. Calhoun.  4./14/16   • LVH (left ventricular hypertrophy) 2/18/2016    Possible hypertrophic cardiomyopathy; per echo March 2016:  Hyperdynamic left ventricular systolic function. Evidence of mid cavitary onstruction with valsalva maneuver. Resting obstruction, 14.2 mmHg, with valsalva: 58 mmHg    • Nephrolithiasis    • Nonexudative age-related macular degeneration     bilateral, early dry stage   • OA (osteoarthritis) of knee 4/2/2015   • Other plastic surgery for unacceptable cosmetic appearance 12/14/2011   • Paroxysmal atrial flutter (CMS/HCC)    • Plantar fascial fibromatosis 2/1/2016   • Poly DJD Arthritis    • Pulmonary nodules    • S/P AVR     2/18/16: Sev. AS ZITA 0.96 cm².Hyperdynamic LVSF.S/P AVR, mechanical 6/1/16@[facility].  On Coumadin anticoagulation   • S/P AVR (aortic valve replacement) 6/29/2016 June 1, 2016 at [facility]. 21mm Top Hat Mechanical AVR   • Tubular adenoma of colon 8/21/2020 4/12/19:LGIB -Hgb was 8.3 ,7.4.S/P1 unit of PRBC .4/16/19 EGD:normal.TC:Hemorrhoids,TIC, tubular adenoma polypectomy-Dr. Vazquez->Prob.TIC bleed on Coumadin.       PAST FAMILY HISTORY   Family History   Problem Relation Age of Onset   • Heart disease Mother         Valve replaced   • Arthritis Mother    • Stroke Father         TIA   • Dementia/Alzheimers Father    • Arthritis Sister    • Heart disease Sister    • Diabetes Sister    • Heart disease Brother         Valve replacement   • Clotting Disorder Maternal Grandmother    • Asthma Paternal Grandfather    • Arthritis Sister    • Diabetes Sister       PAST SURGICAL HISTORY   Past Surgical History:   Procedure Laterality Date   • Aortic valve replacement  2016    mechanical @ [facility]   • Breast biopsy     • Cardiac catherization     • Colonoscopy  2011    repeat 5 yrs   • Echo heart limited  2016    Check Pericardial effusion   • Echo heart resting w doppler & color flow  10/19/2015   • Echo heart resting w doppler & color flow  2016   • Echo heart resting w doppler & color flow  2017   • Echo heart resting w doppler & color flow  2018   • Hiatal hernia repair  2017   • Medial branch block  10/10/2013    L2, L3, L4and L5      SOCIAL HISTORY   Social History     Socioeconomic History   • Marital status: Single     Spouse name: Not on file   • Number of children: 1   • Years of education: 16   • Highest education level: Not on file   Occupational History   • Occupation: Retired Teacher   • Occupation: TV anchor   Tobacco Use   • Smoking status: Former Smoker     Types: Cigarettes     Start date: 1960     Quit date: 2000     Years since quittin.4   • Smokeless tobacco: Never Used   • Tobacco comment: chews nicorette gum   Vaping Use   • Vaping Use: Some days   • Substances: Nicotine   Substance and Sexual Activity   • Alcohol use: Yes     Alcohol/week: 8.0 standard drinks     Types: 8 Standard drinks or equivalent per week     Comment: social/moderate    • Drug use: No   • Sexual activity: Not Currently     Birth control/protection: Post-menopausal   Other Topics  Concern   •  Service No   • Blood Transfusions No   • Caffeine Concern Yes     Comment: 2 or 3 cups of coffee, 1 can of diet coke daily.   • Occupational Exposure No   • Hobby Hazards No   • Sleep Concern Yes     Comment: Problems falling asleep.   • Stress Concern Yes     Comment: Missing family that lives far away.   • Weight Concern Yes     Comment: wants to lose 20 lbs   • Special Diet Yes     Comment: Low carbs   • Back Care Yes     Comment: Has had shot's in the past.   • Exercise No   • Bike Helmet Not Asked     Comment: Don't ride   • Seat Belt Yes   • Self-Exams Yes     Comment: Not good at checking, but trys at times.   Social History Narrative    Social History:    She is .  Ex  but apparently passed  She lives in [place] by herself.  She gets help from her friend.  She has 1 adult daughter.  She is retired teacher and TV anchor.    Former Smoker; Start date: 9/22/1960;1-1 1/2 PPD ×25 years Quit date: 7/22/2000.      Alcohol Use Yes; (social)      Drug Use No    ANISH REES ([place]) Patient [id number]-[phone]    RADHA REES DR ([place]) Pfrabsv-[phone]        PMH:    2/18/16: Sev. AS ZITA 0.96 cm².Hyperdynamic LVSF.S/P AVR, mechanical 6/1/16@[facility].  On Coumadin anticoagulation    Stress test:3/2/16:  EF 73% o/w NEG.    4/12/19:LGIB -Hgb was 8.3 ,7.4.S/P1 unit of PRBC .4/16/19 EGD:normal.TC:Hemorrhoids,TIC, tubular adenoma polypectomy-Dr. Vazquez->Prob.TIC bleed on Coumadin.     Social Determinants of Health     Financial Resource Strain: Low Risk    • Social Determinants: Financial Resource Strain: None   Food Insecurity:    • Social Determinants: Food Insecurity: Not on file   Transportation Needs: No Transportation Needs   • Lack of Transportation (Medical): No   • Lack of Transportation (Non-Medical): No   Physical Activity:    • Days of Exercise per Week: Not on file   • Minutes of Exercise per Session: Not on file   Stress: Low Risk    • Social  Determinants: Stress: A little bit   Social Connections: Socially Integrated   • Social Determinants: Social Connections: 5 or more times a week   Intimate Partner Violence: Not At Risk   • Social Determinants: Intimate Partner Violence Past Fear: No   • Social Determinants: Intimate Partner Violence Current Fear: No           MEDICATIONS     Current Discharge Medication List      CONTINUE these medications which have NOT CHANGED    Details   HYDROcodone-acetaminophen (NORCO) 5-325 MG per tablet Take 1 tablet by mouth 4 times daily as needed for Pain.  Qty: 100 tablet, Refills: 0    Associated Diagnoses: Chronic low back pain, unspecified back pain laterality, unspecified whether sciatica present; Primary osteoarthritis of both knees; Degeneration of lumbar or lumbosacral intervertebral disc; Lumbosacral spondylosis without myelopathy      DULoxetine (CYMBALTA) 60 MG capsule Take 60 mg by mouth daily.      warfarin (COUMADIN) 2.5 MG tablet TAKE BY MOUTH AS DIRECTED BY THE COUMADIN CLINIC  Qty: 180 tablet, Refills: 0    Associated Diagnoses: S/P AVR (aortic valve replacement)      potassium CHLORIDE (KLOR-CON) 20 MEQ packet 1 pack, 3 days a week with torsemide      zolpidem (Ambien) 10 MG tablet Take 1 tablet by mouth nightly as needed for Sleep.  Qty: 30 tablet, Refills: 5    Comments: Early refill ok, lost while travelling  Associated Diagnoses: Insomnia, unspecified type      ferrous gluconate (FERGON) 324 (38 Fe) MG tablet TAKE 1 TABLET BY MOUTH DAILY  Qty: 90 tablet, Refills: 3      predniSONE (DELTASONE) 5 MG tablet Take 10 mg for 5 days, then take 5 mg for 5 days, then take 2.5 mg for 5 days then STOP.  Qty: 18 tablet, Refills: 0      furosemide (Lasix) 20 MG tablet Take 1 tablet by mouth daily.  Qty: 30 tablet, Refills: 11      pantoprazole (PROTONIX) 40 MG tablet Take 1 tablet by mouth 2 times daily (with meals).  Qty: 180 tablet, Refills: 2    Associated Diagnoses: Gastroesophageal reflux disease       naLOXone (NARCAN) 4 MG/0.1ML nasal spray Spray the content of 1 device into 1 nostril. Call 911. May repeat with 2nd device in alternate nostril if no response in 2-3 minutes.  Qty: 2 each, Refills: 1      loratadine-pseudoephedrine (CLARITIN-D 24-hour)  MG per 24 hr tablet Take 1 tablet by mouth daily.  Qty: 30 tablet, Refills: 3      AMIODarone (PACERONE) 200 MG tablet Take 1 tablet by mouth daily.  Qty: 90 tablet, Refills: 3      Carboxymethylcellulose Sodium (ARTIFICIAL TEARS OP) 1 drop.      Polyethylene Glycol 3350 (MIRALAX PO) 17 g.      amoxicillin (AMOXIL) 500 MG tablet       metoPROLOL succinate (TOPROL-XL) 50 MG 24 hr tablet Take 1 tablet by mouth 2 times daily.  Qty: 180 tablet, Refills: 3    Associated Diagnoses: Chronic diastolic heart failure (CMS/HCC)      tretinoin (RETIN-A) 0.05 % cream Apply topically nightly.  Qty: 45 g, Refills: 1    Associated Diagnoses: Acne, unspecified acne type      ondansetron (ZOFRAN) 4 MG tablet Take 1 tablet by mouth every 8 hours as needed for Nausea.  Qty: 60 tablet, Refills: 2    Associated Diagnoses: Dyspepsia      diclofenac (diclofenac) 1 % gel Apply 2 g topically 4 times daily. Apply to the affected area.  Qty: 300 g, Refills: 5    Associated Diagnoses: Pain in joint of right shoulder; Degeneration of lumbar or lumbosacral intervertebral disc      fluticasone (FLONASE) 50 MCG/ACT nasal spray Spray 2 sprays in each nostril daily. SHAKE LIQUID  Qty: 48 g, Refills: 3    Comments: **Patient requests 90 days supply**  Associated Diagnoses: Seasonal allergies      oxygen (O2) gas Inhale 2 L/min into the lungs continuous. Only at night      cholecalciferol (VITAMIN D3) 1000 units tablet Take 1,000 Units by mouth daily.      Multiple Vitamins-Minerals (MULTIVITAMIN ADULT PO) Take 1 tablet by mouth daily.              ALLERGIES   ALLERGIES:   Allergen Reactions   • Codeine NAUSEA   • Bupropion Other (See Comments)     sweats        REVIEW OF SYSTEMS    Constitutional:  Positive for fatigue and activity change.  Negative for appetite change, chills and fever.   HENT: Negative for congestion, rhinorrhea, sinus pressure and sore throat.    Eyes: Negative for photophobia and visual disturbance.   Respiratory: Negative for chest tightness and shortness of breath.    Cardiovascular: Negative for chest pain and palpitations.   Gastrointestinal: Negative for abdominal pain, diarrhea, nausea and vomiting.   Genitourinary: Negative for decreased urine volume, difficulty urinating, dysuria, flank pain and urgency.   Musculoskeletal:  Positive for knee pain and swelling .  Positive for edema.  Negative for arthralgias, back pain, myalgias, neck pain and neck stiffness.   Skin: Negative for color change, pallor, rash and wound.   Neurological:  Positive for weakness.  Negative for dizziness, syncope, light-headedness, numbness and headaches.   Hematological: Negative for adenopathy. Does not bruise/bleed easily.   Psychiatric/Behavioral: Negative for confusion. The patient is not nervous/anxious.      PHYSICAL EXAM   Triage Vitals:   ED Triage Vitals   ED Triage Vitals Group      Temp 01/04/22 1532 98.3 °F (36.8 °C)      Heart Rate 01/04/22 1532 90      Resp 01/04/22 1532 20      BP 01/1940 (!) 172/88      SpO2 01/04/22 1532 95 %      EtCO2 mmHg --       Height 01/04/22 2124 5' 5\" (1.651 m)      Weight 01/04/22 1532 175 lb (79.4 kg)      Weight Scale Used 01/04/22 1532 ED Stated      BMI (Calculated) 01/04/22 2124 31.66      IBW/kg (Calculated) 01/04/22 2124 57          Constitutional: This patient is a well developed, and well nourished, very pleasant 80 year old female who is sitting in bed in no acute distress. The patient does not appear to be in pain or discomfort at this time.   Eyes: Pupils are equal, round, and reactive to light. Extraocular movements are intact. Conjunctiva pink   ENT: Oropharynx is clear. There are moist mucous membranes.   Neck: Supple. No  lymphadenopathy.Trachea midline  Respiratory: The exam is clear to auscultation bilaterally with normal effort. There are no wheezes, rales, or rhonchi.   Cardiovascular: The patient has a regular rate and rhythm. There are no obvious murmurs, rubs, or gallops.2+ dorsal pedal pulses bilaterally.   Gastrointestinal: Soft, nontender, nondistended. There is no obvious hepatosplenomegaly. There is no rebound or guarding. The patient has normoactive bowel sounds.   Musculoskeletal:  There is erythema and ecchymosis to the anterior surfaces of bilateral knees with +1 to 2 pitting edema to bilateral lower extremities from the knee to the foot.  Plus two bilateral DP/PT pulses with good CMS/neurovascular function noted.  No significant range of motion limitations to bilateral knees.   Skin: Warm and dry without rashes.   Neurologic: Alert and oriented x3. The patient has a GCS of 15.Cranial nerves II through XII intact. Sensation to touch grossly intact.     DIAGNOSTIC INVESTIGATIONS   Radiology Studies:   Radiology reports reviewed.   XR CHEST AP OR PA - PORTABLE   Final Result   IMPRESSION:      -Perhaps very mild/early congestive changes, no torey pulmonary edema.      -Very mild right lateral midlung scarring, as before.      XR Knee 3 View Bilateral   Final Result   IMPRESSION:   1. Advanced degenerative changes involving both knees most pronounced along   the patellofemoral joints.    2. Joint effusion bilaterally.              Laboratory Studies:   Labs were ordered and reviewed.   Labs Reviewed   COMPREHENSIVE METABOLIC PANEL - Abnormal; Notable for the following components:       Result Value    Creatinine 1.07 (*)     Glomerular Filtration Rate 49 (*)     All other components within normal limits   NT PROBNP - Abnormal; Notable for the following components:    NT-proBNP 704 (*)     All other components within normal limits   PROTHROMBIN TIME - Abnormal; Notable for the following components:    Prothrombin Time  39.8 (*)     All other components within normal limits   CBC WITH AUTOMATED DIFFERENTIAL (PERFORMABLE ONLY) - Abnormal; Notable for the following components:    RDW-CV 15.4 (*)     RDW-SD 55.8 (*)     All other components within normal limits    Narrative:     This is an appended report.  These results have been appended to a previously verified report.   URINALYSIS & REFLEX MICROSCOPY WITH CULTURE IF INDICATED - Abnormal; Notable for the following components:    OCCULT BLOOD, URINALYSIS Trace (*)     ERYTHROCYTES, URINALYSIS 6 to 10 (*)     BACTERIA, URINALYSIS Few (*)     All other components within normal limits   CBC WITH DIFFERENTIAL    Narrative:     The following orders were created for panel order CBC with Automated Differential.  Procedure                               Abnormality         Status                     ---------                               -----------         ------                     CBC with Automated Dif...[[id number]]  Abnormal            Final result                 Please view results for these tests on the individual orders.   RAINBOW DRAW    Narrative:     The following orders were created for panel order [place] Draw Light Blue Top Collected? 1 Label; Red Top Collected? No Labels; Light Green Top Collected? 1 Label; Lavender Top Collected? 1 Label; Gold Top Collected? 1 Label; Pink Top Collected? No Labels; Grey Top Collected? 1 Label.  Procedure                               Abnormality         Status                     ---------                               -----------         ------                     Light Blue Top[[id number]]                                 In process                 Light Green Top[[id number]]                                In process                 Lavender Top[[id number]]                                   In process                 Gold Top[[id number]]                                       In process                 Valles Top Tube[[id number]]                                   In process                   Please view results for these tests on the individual orders.   LIGHT BLUE TOP   LIGHT GREEN TOP   LAVENDER TOP   GOLD TOP   GREY TOP TUBE   CBC WITH DIFFERENTIAL    Narrative:     The following orders were created for panel order CBC with Automated Differential.  Procedure                               Abnormality         Status                     ---------                               -----------         ------                     CBC with Automated Dif...[[id number]]                                                   Please view results for these tests on the individual orders.   MAGNESIUM   BASIC METABOLIC PANEL   PROTHROMBIN TIME   CBC WITH AUTOMATED DIFFERENTIAL (PERFORMABLE ONLY)   RAPID SARS-COV-2 BY PCR        Monitor and Pulse Ox:   Pulse oximetry is 93 % on 2 liters/min via nasal cannula , which is interpreted as normal by me.     Patient was placed on cardiac monitor with normal sinus rhythm at a rate of 80 bpm, no ectopy, interpreted by me.     ASSESSMENT AND PLAN     Vitals:    01/04/22 2124   BP: (!) 182/84   Pulse: 80   Resp: 18   Temp: 97.5 °F (36.4 °C)    [reviewed]     ED Course:    1925 - Dr. Pacheco with hospitalist notified of patient's need for admission for further treatment and evaluation of difficulty ambulating due to bilateral knee injuries.  She admits that she has not been taking her Lasix as prescribed due to knee pain and painful ambulation to the bathroom related concerns.  She has been reassessed multiple times in the ED.  Patient extremely anxious with INR elevation of 4.0.  Will hold warfarin at this time.  Patient states she is unsafe to go home.  Hospitalist recommends obs medical bed.      Interventions:   Medications   AMIODarone (PACERONE) tablet 200 mg (has no administration in time range)   DULoxetine (CYMBALTA) capsule 60 mg (has no administration in time range)   fluticasone (FLONASE) 50 MCG/ACT nasal spray 2 spray  (has no administration in time range)   HYDROcodone-acetaminophen (NORCO) 5-325 MG per tablet 1 tablet (has no administration in time range)   metoPROLOL succinate (TOPROL-XL) ER tablet 50 mg (has no administration in time range)   pantoprazole (PROTONIX) EC tablet 40 mg (has no administration in time range)   polyethylene glycol (MIRALAX) packet 17 g (has no administration in time range)   zolpidem (AMBIEN) tablet 5 mg (has no administration in time range)   sodium chloride 0.9 % flush bag 25 mL (has no administration in time range)   sodium chloride (PF) 0.9 % injection 2 mL (has no administration in time range)   acetaminophen (TYLENOL) tablet 650 mg (has no administration in time range)   ondansetron (ZOFRAN) injection 4 mg (has no administration in time range)   clotrimazole (LOTRIMIN) 1 % cream (has no administration in time range)   ceFAZolin (ANCEF) syringe 1,000 mg (has no administration in time range)   furosemide (LASIX INJECT) injection 40 mg (has no administration in time range)   potassium CHLORIDE (KLOR-CON M) irish ER tablet 40 mEq (has no administration in time range)   metoPROLOL succinate (TOPROL-XL) ER tablet 50 mg (50 mg Oral Given 1/1940)   furosemide (LASIX INJECT) injection 40 mg (40 mg Intravenous Given 1/4/22 1952)        Repeat Vitals:   Vitals:    01/04/22 2050 01/04/22 2059 01/04/22 2105 01/04/22 2124   BP:   (!) 180/77 (!) 182/84   BP Location:    LUE - Left upper extremity   Patient Position:    Semi-Anthony's   Pulse: 82 81 81 80   Resp:    18   Temp:    97.5 °F (36.4 °C)   TempSrc:    Temporal   SpO2: 93% 93% 93%    Weight:    86.3 kg (190 lb 4.1 oz)   Height:    5' 5\" (1.651 m)        Procedures      CLINICAL IMPRESSION   1. Fall in home, initial encounter    2. Hypervolemia, unspecified hypervolemia type    3. Supratherapeutic INR    4. Acute pain of both knees         DISPOSITION   Admit to medical floor     NATALIO Orlando  01/04/22 [id number]     verbal cues/nonverbal cues (demo/gestures)

## 2023-02-02 NOTE — BRIEF OPERATIVE NOTE - OPERATION/FINDINGS
Right ankle lateral malleolus fracture and medial clear space widening now s/p ORIF with tightrope syndesmotic fixation

## 2023-02-02 NOTE — BRIEF OPERATIVE NOTE - NSICDXBRIEFPROCEDURE_GEN_ALL_CORE_FT
PROCEDURES:  Open reduction and internal fixation of bimalleolar fracture of right ankle 02-Feb-2023 10:26:08  Alejandro Hassan

## 2023-02-02 NOTE — PHYSICAL THERAPY INITIAL EVALUATION ADULT - ADDITIONAL COMMENTS
Pt states that she lives in a pvt apt with her  and daughter +elevator access. Pt reports that she was independent prior to admission but owns axillary crutches that she utilized prior to surgery. Pt reported  and daughter are able to assist as needed around her home. Pt states that she lives in a pvt apt with her  and daughter +elevator access, +1 small step to enter. Pt reports that she was independent prior to admission and owns axillary crutches that she utilized prior to surgery. Pt reported  and daughter are able to assist as needed throughout recovery.

## 2023-02-02 NOTE — PHYSICAL THERAPY INITIAL EVALUATION ADULT - NSPTDMEREC_GEN_A_CORE
Pt owns axillary crutches Pt owns axillary crutches and discussed use of RW however pt would need to buy RW and is unsure whether she will or not/rolling walker Pt owns axillary crutches and discussed use of RW however pt would need to buy RW and is unsure whether she will or not. Assist as needed from family/rolling walker

## 2023-02-02 NOTE — PHYSICAL THERAPY INITIAL EVALUATION ADULT - PERTINENT HX OF CURRENT PROBLEM, REHAB EVAL
51 y/o F s/p R ankle ORIF POD0 with pmhx of HTN, DM2, Iron Deficiency Anemia, Diabetic Nephropathy, presents to pst for evaluation. Patient reports she twisted right ankle while walking down the stairs on 01/25/2023. She was diagnosed with closed right ankle fracture.

## 2023-02-02 NOTE — ASU DISCHARGE PLAN (ADULT/PEDIATRIC) - ASU DC SPECIAL INSTRUCTIONSFT
WOUND CARE:  Please keep the splint clean and dry.  BATHING: You may shower and/or sponge bathe with the splint protected. Use a plastic bag or cast cover.  ACTIVITY: You are non-weight bearing of the right lower extremity with crutches. No heavy lifting or straining. Otherwise, you may return to your usual level of physical activity. If you are taking narcotic pain medication DO NOT drive a car, operate machinery or make important decisions.  DIET: Return to your usual diet.  PAIN CONTROL: Please take medication as prescribed. If you have been prescribed a narcotic (such as Percocet), please be aware that narcotic pain medicine can cause extreme nausea and constipation. Drink plenty of water and take diuretics (colace, Miralax) as needed. You can get them from your local pharmacy. If you have been prescribed a narcotic (such as oxycodone), please take for severe pain only. You can take up to 8 Tylenol 325 mg a day while taking oxycodone. Alternate between taking over the counter Ibuprofen and Tylenol so you are taking pain medication every 3-4 hours if your pain is severe.  NOTIFY YOUR SURGEON IF YOU HAVE: any bleeding that does not stop, any pus draining from your wound(s), any fever (over 100.4 F) persistent nausea/vomiting, if you are unable to urinate, or if your pain is not controlled on your discharge pain medications.  Please follow up with your primary care physician in one week regarding your hospitalization, bring copies of your discharge paperwork.  Please follow up with your surgeon, Dr. Cardenas in 10-14 days. WOUND CARE:  Please keep the splint clean and dry.  BATHING: You may shower and/or sponge bathe with the splint protected. Use a plastic bag or cast cover.  ACTIVITY: You are non-weight bearing of the right lower extremity with crutches. No heavy lifting or straining. Otherwise, you may return to your usual level of physical activity. If you are taking narcotic pain medication DO NOT drive a car, operate machinery or make important decisions.  DIET: Return to your usual diet.  PAIN CONTROL: Please take medication as prescribed. If you have been prescribed a narcotic (such as Percocet), please be aware that narcotic pain medicine can cause extreme nausea and constipation. Drink plenty of water and take diuretics (colace, Miralax) as needed. You can get them from your local pharmacy. If you have been prescribed a narcotic (such as oxycodone), please take for severe pain only. You can take up to 8 Tylenol 325 mg a day while taking oxycodone. Alternate between taking over the counter Ibuprofen and Tylenol so you are taking pain medication every 3-4 hours if your pain is severe.  NOTIFY YOUR SURGEON IF YOU HAVE: any bleeding that does not stop, any pus draining from your wound(s), any fever (over 100.4 F) persistent nausea/vomiting, if you are unable to urinate, or if your pain is not controlled on your discharge pain medications.  Please follow up with your primary care physician in one week regarding your hospitalization, bring copies of your discharge paperwork.  Please follow up with your surgeon, Dr. Cardenas in 10-14 days.    As per your conversation with the Endocrinology team:        1. You will be discharged home on Lantus 15U at bedtime (to start 2/3/23)      2. Continue to monitor your blood sugars closely       3.  For now: hold glipizide but continue glimepiride      4. You are to follow up with your  PMD / Endocrinologist in 3-4 weeks      5. You may also follow up at the Endocrine Clinic @ [phone]       6. Prior to discharge, you will be given instructions on self-administering subcutaneous injections WOUND CARE:  Please keep the splint clean and dry.  BATHING: You may shower and/or sponge bathe with the splint protected. Use a plastic bag or cast cover.  ACTIVITY: You are non-weight bearing of the right lower extremity with crutches. No heavy lifting or straining. Otherwise, you may return to your usual level of physical activity. If you are taking narcotic pain medication DO NOT drive a car, operate machinery or make important decisions.  DIET: Return to your usual diet.  PAIN CONTROL: Please take medication as prescribed. If you have been prescribed a narcotic (such as Percocet), please be aware that narcotic pain medicine can cause extreme nausea and constipation. Drink plenty of water and take diuretics (colace, Miralax) as needed. You can get them from your local pharmacy. If you have been prescribed a narcotic (such as oxycodone), please take for severe pain only.   NOTIFY YOUR SURGEON IF YOU HAVE: any bleeding that does not stop, any pus draining from your wound(s), any fever (over 100.4 F) persistent nausea/vomiting, if you are unable to urinate, or if your pain is not controlled on your discharge pain medications.  Please follow up with your primary care physician in one week regarding your hospitalization, bring copies of your discharge paperwork.  Please follow up with your surgeon, Dr. Cardenas in 10-14 days.    As per your conversation with the Endocrinology team:        1. You will be discharged home on Lantus 15U at bedtime (to start 2/3/23)      2. Continue to monitor your blood sugars closely       3.  For now: hold glipizide but continue glimepiride      4. You are to follow up with your  PMD / Endocrinologist in 3-4 weeks      5. You may also follow up at the Endocrine Clinic @ [phone]       6. Prior to discharge, you will be given instructions on self-administering subcutaneous injections WOUND CARE:  Please keep the splint clean and dry.  BATHING: You may shower and/or sponge bathe with the splint protected. Use a plastic bag or cast cover.  ACTIVITY: You are non-weight bearing of the right lower extremity with crutches. No heavy lifting or straining. Otherwise, you may return to your usual level of physical activity. If you are taking narcotic pain medication DO NOT drive a car, operate machinery or make important decisions.  DIET: Return to your usual diet.  PAIN CONTROL: Please take medication as prescribed. If you have been prescribed a narcotic (such as Percocet), please be aware that narcotic pain medicine can cause extreme nausea and constipation. Drink plenty of water and take diuretics (colace, Miralax) as needed. Take Ibuprofen 400-600mg for pain every 6 hours. You can get them from your local pharmacy.   NOTIFY YOUR SURGEON IF YOU HAVE: any bleeding that does not stop, any pus draining from your wound(s), any fever (over 100.4 F) persistent nausea/vomiting, if you are unable to urinate, or if your pain is not controlled on your discharge pain medications.  Please follow up with your primary care physician in one week regarding your hospitalization, bring copies of your discharge paperwork.  Please follow up with your surgeon, Dr. Cardenas in 10-14 days.    As per your conversation with the Endocrinology team:        1. You will be discharged home on Lantus 15U at bedtime (to start 2/3/23)      2. Continue to monitor your blood sugars closely       3.  For now: hold glipizide but continue glimepiride      4. You are to follow up with your  PMD / Endocrinologist in 3-4 weeks      5. You may also follow up at the Endocrine Clinic @ [phone]       6. Prior to discharge, you will be given instructions on self-administering subcutaneous injections WOUND CARE:  Please keep the splint clean and dry.  BATHING: You may shower and/or sponge bathe with the splint protected. Use a plastic bag or cast cover.  ACTIVITY: You are non-weight bearing of the right lower extremity with crutches. No heavy lifting or straining. Otherwise, you may return to your usual level of physical activity. If you are taking narcotic pain medication DO NOT drive a car, operate machinery or make important decisions.  DIET: Return to your usual diet.  PAIN CONTROL: Please take medication as prescribed. If you have been prescribed a narcotic (such as Percocet), please be aware that narcotic pain medicine can cause extreme nausea and constipation. Drink plenty of water and take diuretics (colace, Miralax) as needed.   Take Ibuprofen 400-600mg for pain every 6 hours x 72 hours, then As NEEDED  You can get them from your local pharmacy.   NOTIFY YOUR SURGEON IF YOU HAVE: any bleeding that does not stop, any pus draining from your wound(s), any fever (over 100.4 F) persistent nausea/vomiting, if you are unable to urinate, or if your pain is not controlled on your discharge pain medications.  Please follow up with your primary care physician in one week regarding your hospitalization, bring copies of your discharge paperwork.  Please follow up with your surgeon, Dr. Cardenas in 10-14 days.    As per your conversation with the Endocrinology team:        1. You will be discharged home on Lantus 15U at bedtime (to start 2/3/23)      2. Continue to monitor your blood sugars closely       3.  For now: hold glipizide but continue glimepiride      4. You are to follow up with your  PMD / Endocrinologist in 3-4 weeks      5. You may also follow up at the Endocrine Clinic @ [phone]       6. Prior to discharge, you will be given instructions on self-administering subcutaneous injections

## 2023-02-02 NOTE — CONSULT NOTE ADULT - PROBLEM SELECTOR RECOMMENDATION 9
Diabetes Education and Nutrition Eval  While in-patient recommend  Start Lantus 15 units qhs  Start Admelog 4 units qac  Low correction scale qac + bedtime  Goal glucose 100-180  Outpt. endo follow-up  Outpt. optho, podiatry, micro/cr  Plan to d/c on Lantus 15 units qhs, metformin 1000mg BID, and glimepiride 4mg BID. No need for additional glipizide if on glimepiride. Dietary and lifestyle modifications discussed at length.   Can adjust further when sees endo next month.  Give Lantus 15 units prior to discharge so she does not need to give the dose tonight at home. Can start Lantus tomorrow night if she gets the dose here before she leaves.

## 2023-02-02 NOTE — PHYSICAL THERAPY INITIAL EVALUATION ADULT - LEVEL OF INDEPENDENCE: STAIR NEGOTIATION, REHAB EVAL
Not applicable Pt shows adequate strength and ROM in order to ascend small step with family assist as needed

## 2023-02-02 NOTE — PHYSICAL THERAPY INITIAL EVALUATION ADULT - NSPTDISCHREC_GEN_A_CORE
Pt cleared for discharge home from PT standpoint, MARIAELENA Huerta and SARAH Mccollum made aware./Outpatient PT

## 2023-02-02 NOTE — PRE-OP CHECKLIST - NEEDLE GAUGE
Brief Operative Note    Patient: Bronwyn Bryant 64 year old female    MRN: [id number]    Surgeon(s): Ruben Aburto MD  Phone Number: [phone]                       Surgeon(s) and Role:     * Ruben Aburto MD - Primary    Assistant(s): Homero Howell PA-C      Pre-Op Diagnosis: Left ankle pain, unspecified chronicity [M25.572]  Trimalleolar fracture of ankle, closed, left, initial encounter [P71.295L]     Post-Op Diagnosis: same     Procedure: Procedure(s):  ORIF LEFT TRIMAL ANKLE FRACTURE, with SYNDESMOTIC STABILIZATION    Anesthesia Type: General                                   Complications: None    Description: As above    Findings: As above    Specimens Removed: No specimens collected     Estimated Blood Loss: 100cc    Assistant Tasks: Opening and closing     Implants:   Implant Name Type Inv. Item Serial No.  Lot No. LRB No. Used Action   SYSTEM FX TGHTRP XP SS SYNDESMOSIS REPR - SNA [place] SYSTEM FX TGHTRP XP SS SYNDESMOSIS REPR NA [employer] [id number] Left 1 Implanted   SCREW BN 2.7MM 20MM 2.5MM FULL THRD SELF TAP SM - SNA Screw SCREW BN 2.7MM 20MM 2.5MM FULL THRD SELF TAP SM NA [employer] Trauma NA Left 1 Implanted   SCREW BN 2.7MM 30MM FULL THRD SELF TAP SM HEX SCKT - SNA Screw SCREW BN 2.7MM 30MM FULL THRD SELF TAP SM HEX SCKT NA [employer] Trauma NA Left 1 Implanted   SCREW BN 3.5MM 6MM 12MM 2.5MM FULL THRD SELF TAP - SNA Screw SCREW BN 3.5MM 6MM 12MM 2.5MM FULL THRD SELF TAP NA [employer] Trauma NA Left 1 Implanted   SCREW BN 3.5MM 6MM 14MM SELF TAP LOWPRFL SM HEX SS - SNA Screw SCREW BN 3.5MM 6MM 14MM SELF TAP LOWPRFL SM HEX SS NA [employer] Trauma NA Left 2 Implanted   SCREW BN 3.5MM 6MM 75MM SELF TAP LOWPRFL SM HEX SS - SNA Screw SCREW BN 3.5MM 6MM 75MM SELF TAP LOWPRFL SM HEX SS NA [employer] Trauma NA Left 1 Implanted   SCREW BN 3.5MM 6MM 85MM 2.5MM FULL THRD SELF TAP - SNA Screw SCREW BN 3.5MM 6MM 85MM 2.5MM FULL THRD SELF TAP NA [employer] Trauma NA Left 1 Implanted    SCREW BN 4MM 6MM 16MM 2.5MM FULL THRD SM HEX SCKT - SNA Screw SCREW BN 4MM 6MM 16MM 2.5MM FULL THRD SM HEX SCKT NA [employer] Trauma NA Left 1 Implanted   SCREW BN 4MM 6MM 18MM 2.5MM FULL THRD SM HEX SCKT - SNA Screw SCREW BN 4MM 6MM 18MM 2.5MM FULL THRD SM HEX SCKT NA [employer] Trauma NA Left 1 Implanted   SCREW BN 4MM 6MM 22MM 2.5MM FULL THRD SM HEX SCKT - SNA Screw SCREW BN 4MM 6MM 22MM 2.5MM FULL THRD SM HEX SCKT NA [employer] Trauma NA Left 1 Implanted   SCREW BN 4MM 6MM 22MM 2.5MM FULL THRD SM HEX SCKT - SNA Screw SCREW BN 4MM 6MM 22MM 2.5MM FULL THRD SM HEX SCKT NA [employer] Trauma NA Left 1 Implanted   PLATE BN [id number] HOLE COLLAR 2.73.5MM FELICE 3.5 - SNA Plate PLATE BN [id number] HOLE COLLAR 2.73.5MM FELICE 3.5 NA [employer] Trauma NA Left 1 Implanted         I was present for the key portions of the procedure and was immediately available for the non-key portions       20

## 2023-02-02 NOTE — CONSULT NOTE ADULT - ASSESSMENT
53 y/o F w/ uncontrolled Type 2 DM w/ hyperglycemia, HTN, HLD admitted for ORIF of right ankle (high risk patient with severely uncontrolled diabetes with A1c of 12.4% at high risk of CAD and CVA with high level decision-making).

## 2023-02-02 NOTE — PHYSICAL THERAPY INITIAL EVALUATION ADULT - ACTIVE RANGE OF MOTION EXAMINATION, REHAB EVAL
except R ankle in splint/bilateral upper extremity Active ROM was WFL (within functional limits)/bilateral  lower extremity Active ROM was WFL (within functional limits)

## 2023-02-02 NOTE — PROGRESS NOTE ADULT - SUBJECTIVE AND OBJECTIVE BOX
ORTHO ATTENDING POST OP    s/p ORIF R ankle  NWB R  LE  AO splint  elevation   BID  PT in PACU- crutch training  Endocrine in PACU insulin recs  ice  f/u 2 weeks

## 2023-02-02 NOTE — ASU PATIENT PROFILE, ADULT - FALL HARM RISK - HARM RISK INTERVENTIONS

## 2023-02-02 NOTE — PRE-ANESTHESIA EVALUATION ADULT - LAST CARDIAC ANGIOGRAM/IMAGING
Addended by: FELIX BURTON on: 7/12/2021 10:20 AM     Modules accepted: Orders    
Addended by: FELIX BURTON on: 7/8/2021 10:17 AM     Modules accepted: Orders    
Denies

## 2023-02-02 NOTE — CHART NOTE - NSCHARTNOTEFT_GEN_A_CORE
Met with patient and reviewed the following:    -A1c LEVEL: Present and goal  -Blood glucose goals: 100s to 150s as out pt  -Glucose monitoring frequency: ac and hs  -Hypoglycemia prevention,detection and treatment  -Healthy eating and portion control  -Insulin(s) action, time of administration and side effects  -Importance of follow up care  Reviewed use of insulin pen  Pt able to give teach back with no assistance  Educated patient on current and potential complications of Diabetes (Retinopathy, neuropathy, nephropathy, MI, stroke, delayed wound healing )  Spent over 35 minutes providing face to face education.

## 2023-02-02 NOTE — CHART NOTE - NSCHARTNOTEFT_GEN_A_CORE
POC    POC    Pt seen in PACU  No complaints @ present  Pain appears under control  Block/anesthesia still in place  No c/o SOB Chest Pain n/v     T(C): 36.2 (02-02-23 @ 11:00), Max: 36.7 (02-02-23 @ 06:24)  HR: 96 (02-02-23 @ 13:00) (87 - 96)  BP: 155/74 (02-02-23 @ 13:00) (122/79 - 155/74)  RR: 16 (02-02-23 @ 13:00) (14 - 16)  SpO2: 95% (02-02-23 @ 13:00) (95% - 99%)                            12.8   11.46 )-----------( 424      ( 01 Feb 2023 15:58 )             40.5       O/E:   GEN: Awake, alert, in NAD  CHEST: CTAB   ABD: Soft / benign ND/ NT  EXT:  RLE        [ x] CAST/ Splint C/D         HMV [ N ]          Compartments / Calves soft         Decreased motor and sensory 2/2 anesthesia / block  digits: warm / well-perfused  cap refill brisk @ 2-3 secs          Cap refill 2-3 secs    Assessment:          52yFemale  s/p Open Reduction Internal Fixation / Surgical Repair R ankle    Plan:  -  Serial n/v checks  -  Ice / elevate  -  PT: NWB RLE  -  DVT Proph: ASA BID  -  Pain Control: IV/PO Rx  -  Dispo: anticipate home      ***See Above  Chun SMITH  Orthopedics  B: [phone]  S: [phone] POC    POC    Pt seen in PACU  No complaints @ present  Pain appears under control  Block/anesthesia still in place  No c/o SOB Chest Pain n/v     T(C): 36.2 (02-02-23 @ 11:00), Max: 36.7 (02-02-23 @ 06:24)  HR: 96 (02-02-23 @ 13:00) (87 - 96)  BP: 155/74 (02-02-23 @ 13:00) (122/79 - 155/74)  RR: 16 (02-02-23 @ 13:00) (14 - 16)  SpO2: 95% (02-02-23 @ 13:00) (95% - 99%)                            12.8   11.46 )-----------( 424      ( 01 Feb 2023 15:58 )             40.5       O/E:   GEN: Awake, alert, in NAD  CHEST: CTAB   ABD: Soft / benign ND/ NT  EXT:  RLE        [ x] CAST/ Splint C/D         HMV [ N ]          Compartments / Calves soft         Decreased motor and sensory 2/2 anesthesia / block  digits: warm / well-perfused  cap refill brisk @ 2-3 secs          Cap refill 2-3 secs    Assessment:          52yFemale  s/p Open Reduction Internal Fixation / Surgical Repair R ankle    Plan:  -  Serial n/v checks  -  Ice / elevate  -  PT: NWB RLE w/ crutches (pat has own)  -  DVT Proph: ASA BID  -  Endo consulted for HGB A1C > 12      PT to be dc'd on Lantus 15U QHS      Education on S/Q injections prior to d/c -> home      Pt will hold glipizide but continue glimepiride      Patient has scheduled f/u with her PMD / Endocrinologist in one      Patient can also f/u with Endocrine @ [phone]   -  Dispo: anticipate home  ** All questions and issues addressed at bedside      ***See Above  Chun SMITH  Orthopedics  B: [phone]  S: [phone]

## 2023-02-02 NOTE — ASU DISCHARGE PLAN (ADULT/PEDIATRIC) - PAIN MANAGEMENT
Take over the counter pain medication/Prescriptions electronically submitted to pharmacy from [facility] Next dose of Tylenol will be on or after ___________ ,today/tonight and every 6 hours afterwards for pain management, do not take any Tylenol containing products until this time. Your first dose of Tylenol was given at ___________. Do not exceed more than 4000mg of Tylenol in one 24 hour setting. If no contraindications, you may alternate with Ibuprofen 3 hours after dose of Tylenol. Ibuprofen can be taken every 6 hours./Take over the counter pain medication/Prescriptions electronically submitted to pharmacy from [facility]

## 2023-02-02 NOTE — CONSULT NOTE ADULT - ASSESSMENT
Ms. Odom is a 53 y/o female with a PMHx of HTN, T2DM, SHAWN who presents for right ankle fracture s/p ORIF. Endocrinology consulted for management of diabetes.    Poorly controlled T2DM with hyperglycemia  - HbA1c:  - Home Regimen:   - Endocrinologist:  PLAN  - Hold oral DM agents while inpatient  - Start Lantus  units at bedtime. DO NOT HOLD IF NPO.  - Start Admelog  units TID pre-meal. HOLD IF NPO.  - Use moderate/Use low dose Admelog correction scale pre-meal  - Use moderate/Use low dose Admelog correction scale at bedtime  - Fingerstick BG before meals and bedtime  - Goal -180  - Carbohydrate consistent diet  - RD consult  Discharge plan:  - Discharge medications: ************************  - Patient will need education on how to self-administer insulin, how to check FSBG, as well as hypoglycemia management. Patient to call doctor with persistent high or low BG at home.   - Ensure patient has glucometer, test strips and lancets on discharge.  - Recommend routine outpatient ophthalmology, podiatry and endocrinology f/u    HTN  - Home regimen:  PLAN  - Continue  - Outpatient goal BP <130/80. Management per primary team.    HLD  - Home regimen:  PLAN  - Continue  - Would likely benefit from a statin if no contraindication  - Can check lipid profile if not done recently    Right ankle fracture      Discussed with primary team.    Salvatore Oneil MD, Endocrinology Fellow  Pager [phone] from 9am to 5pm. After hours and on weekends, please call [phone].

## 2023-02-07 PROBLEM — D50.9 IRON DEFICIENCY ANEMIA, UNSPECIFIED: Chronic | Status: ACTIVE | Noted: 2023-02-01

## 2023-02-07 PROBLEM — E11.9 TYPE 2 DIABETES MELLITUS WITHOUT COMPLICATIONS: Chronic | Status: ACTIVE | Noted: 2023-02-01

## 2023-02-07 PROBLEM — H35.00 UNSPECIFIED BACKGROUND RETINOPATHY: Chronic | Status: ACTIVE | Noted: 2023-02-01

## 2023-02-07 PROBLEM — I10 ESSENTIAL (PRIMARY) HYPERTENSION: Chronic | Status: ACTIVE | Noted: 2023-02-01

## 2023-02-16 ENCOUNTER — APPOINTMENT (OUTPATIENT)
Dept: ENDOCRINOLOGY | Facility: CLINIC | Age: 53
End: 2023-02-16

## 2023-02-21 ENCOUNTER — APPOINTMENT (OUTPATIENT)
Dept: ORTHOPEDIC SURGERY | Facility: CLINIC | Age: 53
End: 2023-02-21

## 2023-02-27 ENCOUNTER — APPOINTMENT (OUTPATIENT)
Dept: ORTHOPEDIC SURGERY | Facility: CLINIC | Age: 53
End: 2023-02-27
Payer: MEDICAID

## 2023-02-27 VITALS
BODY MASS INDEX: 29.81 KG/M2 | WEIGHT: 162 LBS | SYSTOLIC BLOOD PRESSURE: 165 MMHG | DIASTOLIC BLOOD PRESSURE: 89 MMHG | HEIGHT: 62 IN | HEART RATE: 87 BPM

## 2023-02-27 PROCEDURE — 99024 POSTOP FOLLOW-UP VISIT: CPT

## 2023-03-08 ENCOUNTER — APPOINTMENT (OUTPATIENT)
Dept: ENDOCRINOLOGY | Facility: CLINIC | Age: 53
End: 2023-03-08
Payer: MEDICAID

## 2023-03-08 VITALS
TEMPERATURE: 98.1 F | WEIGHT: 160 LBS | OXYGEN SATURATION: 97 % | HEART RATE: 88 BPM | DIASTOLIC BLOOD PRESSURE: 70 MMHG | HEIGHT: 62 IN | SYSTOLIC BLOOD PRESSURE: 110 MMHG | RESPIRATION RATE: 16 BRPM | BODY MASS INDEX: 29.44 KG/M2

## 2023-03-08 DIAGNOSIS — Z83.3 FAMILY HISTORY OF DIABETES MELLITUS: ICD-10-CM

## 2023-03-08 LAB
GLUCOSE BLDC GLUCOMTR-MCNC: 232
HBA1C MFR BLD HPLC: 10.5

## 2023-03-08 PROCEDURE — 83036 HEMOGLOBIN GLYCOSYLATED A1C: CPT | Mod: QW

## 2023-03-08 PROCEDURE — 99205 OFFICE O/P NEW HI 60 MIN: CPT | Mod: 25

## 2023-03-08 PROCEDURE — 82962 GLUCOSE BLOOD TEST: CPT

## 2023-03-08 PROCEDURE — G0108 DIAB MANAGE TRN  PER INDIV: CPT

## 2023-03-09 PROBLEM — Z83.3 FAMILY HISTORY OF DIABETES MELLITUS: Status: ACTIVE | Noted: 2023-03-08

## 2023-03-09 RX ORDER — EMPAGLIFLOZIN 10 MG/1
10 TABLET, FILM COATED ORAL DAILY
Qty: 90 | Refills: 3 | Status: DISCONTINUED | COMMUNITY
Start: 2023-03-08 | End: 2023-03-09

## 2023-03-09 NOTE — ASSESSMENT
[FreeTextEntry1] : A1c is high likely due to the insufficient to regimen and dietary indiscretion\par As fasting blood sugars are high, increase Basaglar to 18 units at night.\par Continue glimepiride 4 mg twice daily for now however discussed with patient that eventually will stop this medication as it will provoke weight gain and pancreas burnout.\par Continue metformin 2000 mg daily.  Start Jardiance 10 mg daily\par Discussed SGLT2 side effects including UTIs and euglycemic DKA in setting of severe illness and starvation.\par Patient is up-to-date with ophthalmologist.  Denies retinopathy.\par Patient has a small skin discoloration on the dorsum of the left foot, does not look infected.  Advised to follow-up with podiatrist however patient does not want to go to podiatry at this time.\par Advised to follow-up with nutritionist nutritionist referral given.\par Check the blood test as below.\par \par Diabetes Education:\par Extensive education about diabetes, hyperglycemia, hypoglycemia, glucose self-monitoring with glucometer, glucose target ranges, adherence to diabetes medications, diet, physical activity, importance of following up with outpatient endocrinology/ opthalmology and podiatry appointments discussed. \par Explained the definition of HBA1C and target range. \par Explained the complications of diabetes including stroke, renal failure and blindness\par Reviewed hypoglycemic sign/symptoms and necessary precautions. \par Discussed the goal fasting and postprandial BS at home\par Patient verbalized understanding and agrees with the plan.\par \par RTC in 2 months\par \par

## 2023-03-09 NOTE — HISTORY OF PRESENT ILLNESS
[FreeTextEntry1] : 52 year old female patient with history of Essential Hypertension, Type 2 Diabetes Mellitus with hyperglycemia, Iron Deficiency Anemia, Cervical Radiculopathy, Allergic Dermatitis, Vitamin B12 Deficiency, Vitamin D Deficiency, Microalbuminuria came for type 2 diabetes evaluation\par  \par Diabetes diagnosed for 8 years ago when she got urinary tract infection\par Medication regimen: Metformin 2000 mg daily, Glimepiride 4 mg BID, Stopped glipizide recently, Started taking Basaglar 15 mg once at bedtime, Losartan 50 mg daily, gabapentin 100 mg daily. \par Recent fingersticks: Twice a week, only does fasting blood sugar: 150, 170.\par Hypoglycemic events:  None\par Diet:  \par BF: omelette, pancake, \par Lunch: Millets, \par Dinner:  varies\par Exercise:  Unable to do as patient has ordered procedure done recently\par Opthalmology appointment: In Dec 2022, denies retinopathy\par Peripheral Neuropathy: Yes, takes gabapentin.\par CVD: None\par Infections: None\par Vaccinations: Has received Covid vaccines, Does not want influenza vaccines, Has not taken pneumonia\par  \par \par  \par  \par  \par \par \par \par

## 2023-03-09 NOTE — ADDENDUM
[FreeTextEntry1] : 74 minutes spent the time noted on the day of this patient encounter preparing for, reviewing records, providing and documenting the above E/M service and 50% of time spent face to face counseling and education provided to patient on disease course, and treatment/management. All questions and concerns were answered and addressed in detail.

## 2023-03-09 NOTE — PHYSICAL EXAM
[Alert] : alert [Obese] : obese [No Acute Distress] : no acute distress [Normal Sclera/Conjunctiva] : normal sclera/conjunctiva [No Neck Mass] : no neck mass was observed [No LAD] : no lymphadenopathy [Supple] : the neck was supple [No Respiratory Distress] : no respiratory distress [Normal Rate and Effort] : normal respiratory rate and effort [Clear to Auscultation] : lungs were clear to auscultation bilaterally [Normal S1, S2] : normal S1 and S2 [No Murmurs] : no murmurs [Normal Rate] : heart rate was normal [No Rubs] : no pericardial rub [Normal Bowel Sounds] : normal bowel sounds [Not Tender] : non-tender [Not Distended] : not distended [Soft] : abdomen soft [No Clubbing, Cyanosis] : no clubbing  or cyanosis of the fingernails [No Motor Deficits] : the motor exam was normal [No Sensory Deficits] : the sensory exam was normal to light touch and pinprick [Normal Reflexes] : deep tendon reflexes were 2+ and symmetric [No Tremors] : no tremors [Oriented x3] : oriented to person, place, and time [de-identified] : Right lower extremity wrapped in bandage. Left lower extremity has mild skin discoriation on the dorsum of foot, does not look infected

## 2023-03-09 NOTE — REVIEW OF SYSTEMS
[Recent Weight Loss (___ Lbs)] : recent weight loss: [unfilled] lbs [Polyuria] : polyuria [Polydipsia] : polydipsia [Heat Intolerance] : heat intolerance [Muscle Weakness] : muscle weakness [Joint Stiffness] : joint stiffness [Difficulty Walking] : difficulty walking [Pain/Numbness of Digits] : pain/numbness of digits [Fatigue] : no fatigue [Blurred Vision] : no blurred vision [Neck Pain] : no neck pain [Chest Pain] : no chest pain [Slow Heart Rate] : heart rate is not slow [Palpitations] : no palpitations [Fast Heart Rate] : heart rate is not fast [Lower Ext Edema] : no lower extremity edema [Shortness Of Breath] : no shortness of breath [Cough] : no cough [SOB on Exertion] : no shortness of breath on exertion [Nausea] : no nausea [Constipation] : no constipation [Vomiting] : no vomiting [Diarrhea] : no diarrhea [Headaches] : no headaches [Dizziness] : no dizziness [Tremors] : no tremors [Insomnia] : no insomnia [Cold Intolerance] : no cold intolerance

## 2023-03-21 LAB
ALBUMIN SERPL ELPH-MCNC: 4.2 G/DL
ALP BLD-CCNC: 89 U/L
ALT SERPL-CCNC: 20 U/L
ANION GAP SERPL CALC-SCNC: 16 MMOL/L
AST SERPL-CCNC: 15 U/L
BASOPHILS # BLD AUTO: 0.07 K/UL
BASOPHILS NFR BLD AUTO: 0.6 %
BILIRUB SERPL-MCNC: 0.3 MG/DL
BUN SERPL-MCNC: 7 MG/DL
CALCIUM SERPL-MCNC: 10.4 MG/DL
CHLORIDE SERPL-SCNC: 95 MMOL/L
CHOLEST SERPL-MCNC: 172 MG/DL
CO2 SERPL-SCNC: 23 MMOL/L
CREAT SERPL-MCNC: 0.47 MG/DL
EGFR: 113 ML/MIN/1.73M2
EOSINOPHIL # BLD AUTO: 0.55 K/UL
EOSINOPHIL NFR BLD AUTO: 5 %
GLUCOSE SERPL-MCNC: 232 MG/DL
HCT VFR BLD CALC: 41.8 %
HDLC SERPL-MCNC: 35 MG/DL
HGB BLD-MCNC: 12.4 G/DL
IMM GRANULOCYTES NFR BLD AUTO: 0.2 %
LDLC SERPL CALC-MCNC: 97 MG/DL
LYMPHOCYTES # BLD AUTO: 3.38 K/UL
LYMPHOCYTES NFR BLD AUTO: 31 %
MAN DIFF?: NORMAL
MCHC RBC-ENTMCNC: 26.4 PG
MCHC RBC-ENTMCNC: 29.7 GM/DL
MCV RBC AUTO: 88.9 FL
MONOCYTES # BLD AUTO: 0.65 K/UL
MONOCYTES NFR BLD AUTO: 6 %
NEUTROPHILS # BLD AUTO: 6.25 K/UL
NEUTROPHILS NFR BLD AUTO: 57.2 %
NONHDLC SERPL-MCNC: 137 MG/DL
PLATELET # BLD AUTO: 456 K/UL
POTASSIUM SERPL-SCNC: 4.8 MMOL/L
PROT SERPL-MCNC: 7.6 G/DL
RBC # BLD: 4.7 M/UL
RBC # FLD: 13.2 %
SODIUM SERPL-SCNC: 134 MMOL/L
TRIGL SERPL-MCNC: 201 MG/DL
WBC # FLD AUTO: 10.92 K/UL

## 2023-04-04 ENCOUNTER — APPOINTMENT (OUTPATIENT)
Dept: ORTHOPEDIC SURGERY | Facility: CLINIC | Age: 53
End: 2023-04-04
Payer: MEDICAID

## 2023-04-04 PROCEDURE — 73610 X-RAY EXAM OF ANKLE: CPT | Mod: RT

## 2023-04-04 PROCEDURE — 99024 POSTOP FOLLOW-UP VISIT: CPT

## 2023-04-04 NOTE — H&P PST ADULT - PRIMARY CARE PROVIDER
Problem: Discharge Planning  Goal: Discharge to home or other facility with appropriate resources  4/4/2023 1309 by Shadi Armstrong  Outcome: Progressing    Problem: ABCDS Injury Assessment  Goal: Absence of physical injury  4/4/2023 1309 by Shadi Armstrong  Outcome: Progressing       Problem: Safety - Adult  Goal: Free from fall injury  4/4/2023 1309 by Shadi Armstrong  Outcome: Progressing       Problem: Pain  Goal: Verbalizes/displays adequate comfort level or baseline comfort level  4/4/2023 1309 by Shadi Armstrong  Outcome: Progressing Cruz Avitia [phone], last visit 10/2022

## 2023-04-10 ENCOUNTER — APPOINTMENT (OUTPATIENT)
Dept: INTERNAL MEDICINE | Facility: CLINIC | Age: 53
End: 2023-04-10
Payer: MEDICAID

## 2023-04-10 VITALS
OXYGEN SATURATION: 97 % | RESPIRATION RATE: 16 BRPM | HEART RATE: 88 BPM | TEMPERATURE: 98 F | DIASTOLIC BLOOD PRESSURE: 73 MMHG | HEIGHT: 62 IN | SYSTOLIC BLOOD PRESSURE: 111 MMHG | WEIGHT: 160 LBS | BODY MASS INDEX: 29.44 KG/M2

## 2023-04-10 PROCEDURE — 99213 OFFICE O/P EST LOW 20 MIN: CPT

## 2023-04-10 NOTE — ASSESSMENT
[FreeTextEntry1] : 53 year old female found to have stable Essential Hypertension, Type 2 Diabetes Mellitus with hyperglycemia, Iron Deficiency Anemia, Cervical Radiculopathy, Allergic Dermatitis, Vitamin B12 Deficiency, Vitamin D Deficiency, Microalbuminuria,with the current prescription regimen as recommended, diet and life style modifications, as counseled. Prior results reviewed, interpreted and discussed with the patient during today's examination, as appropriate. Follow up, treatment plan and tests, as ordered.\par \par Total time spent : 25 minutes\par Including:\par Preparation prior to visit - Reviewing prior record, results of tests and Consultation Reports as applicable\par Conducting an appropriate H & P during today's encounter\par Appropriate orders for tests, medications and procedures, as applicable\par Counseling patient \par Note completion\par

## 2023-04-10 NOTE — HISTORY OF PRESENT ILLNESS
[de-identified] : 53 year old  female patient with history of stable Essential Hypertension, Type 2 Diabetes Mellitus with hyperglycemia, Iron Deficiency Anemia, Cervical Radiculopathy, Allergic Dermatitis, Vitamin B12 Deficiency, Vitamin D Deficiency, Microalbuminuria, history as stated, presented for follow up examination. Patient is compliant with all medications. ROS as stated.\par

## 2023-04-10 NOTE — HEALTH RISK ASSESSMENT
[No] : In the past 12 months have you used drugs other than those required for medical reasons? No [No falls in past year] : Patient reported no falls in the past year [0] : 2) Feeling down, depressed, or hopeless: Not at all (0) [Never] : Never [de-identified] : Patient was recently evaluated by GYN/ENDO/ORTHO, findings and recommendations reviewed with the patient during today's examination. [[id number]] : 0

## 2023-04-11 ENCOUNTER — APPOINTMENT (OUTPATIENT)
Dept: ENDOCRINOLOGY | Facility: CLINIC | Age: 53
End: 2023-04-11

## 2023-04-24 ENCOUNTER — APPOINTMENT (OUTPATIENT)
Dept: ORTHOPEDIC SURGERY | Facility: CLINIC | Age: 53
End: 2023-04-24
Payer: MEDICAID

## 2023-04-24 PROCEDURE — 99213 OFFICE O/P EST LOW 20 MIN: CPT | Mod: 24

## 2023-04-24 PROCEDURE — 73610 X-RAY EXAM OF ANKLE: CPT | Mod: RT

## 2023-05-23 ENCOUNTER — NON-APPOINTMENT (OUTPATIENT)
Age: 53
End: 2023-05-23

## 2023-05-24 DIAGNOSIS — H10.10 ACUTE ATOPIC CONJUNCTIVITIS, UNSPECIFIED EYE: ICD-10-CM

## 2023-05-24 RX ORDER — TOBRAMYCIN AND DEXAMETHASONE 3; 1 MG/ML; MG/ML
0.3-0.1 SUSPENSION/ DROPS OPHTHALMIC 4 TIMES DAILY
Qty: 1 | Refills: 2 | Status: ACTIVE | COMMUNITY
Start: 2023-05-24 | End: 1900-01-01

## 2023-06-05 ENCOUNTER — APPOINTMENT (OUTPATIENT)
Dept: ORTHOPEDIC SURGERY | Facility: CLINIC | Age: 53
End: 2023-06-05
Payer: MEDICAID

## 2023-06-05 DIAGNOSIS — S82.841A DISPLACED BIMALLEOLAR FRACTURE OF RIGHT LOWER LEG, INITIAL ENCOUNTER FOR CLOSED FRACTURE: ICD-10-CM

## 2023-06-05 PROCEDURE — 99213 OFFICE O/P EST LOW 20 MIN: CPT

## 2023-06-05 PROCEDURE — 73610 X-RAY EXAM OF ANKLE: CPT | Mod: RT

## 2023-06-08 ENCOUNTER — APPOINTMENT (OUTPATIENT)
Dept: ENDOCRINOLOGY | Facility: CLINIC | Age: 53
End: 2023-06-08
Payer: MEDICAID

## 2023-06-08 VITALS
RESPIRATION RATE: 16 BRPM | HEART RATE: 87 BPM | DIASTOLIC BLOOD PRESSURE: 87 MMHG | SYSTOLIC BLOOD PRESSURE: 156 MMHG | BODY MASS INDEX: 28.89 KG/M2 | WEIGHT: 157 LBS | HEIGHT: 62 IN | OXYGEN SATURATION: 100 % | TEMPERATURE: 98.2 F

## 2023-06-08 PROCEDURE — 82962 GLUCOSE BLOOD TEST: CPT

## 2023-06-08 PROCEDURE — 99214 OFFICE O/P EST MOD 30 MIN: CPT

## 2023-06-08 PROCEDURE — 83036 HEMOGLOBIN GLYCOSYLATED A1C: CPT | Mod: QW

## 2023-06-08 NOTE — PHYSICAL EXAM
[Alert] : alert [Obese] : obese [No Acute Distress] : no acute distress [Normal Sclera/Conjunctiva] : normal sclera/conjunctiva [No Neck Mass] : no neck mass was observed [No LAD] : no lymphadenopathy [Supple] : the neck was supple [No Respiratory Distress] : no respiratory distress [Normal Rate and Effort] : normal respiratory rate and effort [Clear to Auscultation] : lungs were clear to auscultation bilaterally [Normal S1, S2] : normal S1 and S2 [No Murmurs] : no murmurs [Normal Rate] : heart rate was normal [No Rubs] : no pericardial rub [Normal Bowel Sounds] : normal bowel sounds [Not Tender] : non-tender [Not Distended] : not distended [Soft] : abdomen soft [No Clubbing, Cyanosis] : no clubbing  or cyanosis of the fingernails [No Motor Deficits] : the motor exam was normal [No Sensory Deficits] : the sensory exam was normal to light touch and pinprick [Normal Reflexes] : deep tendon reflexes were 2+ and symmetric [No Tremors] : no tremors [Oriented x3] : oriented to person, place, and time [Normal Gait] : normal gait

## 2023-06-08 NOTE — REVIEW OF SYSTEMS
[Recent Weight Loss (___ Lbs)] : recent weight loss: [unfilled] lbs [Polyuria] : polyuria [Fatigue] : no fatigue [Decreased Appetite] : appetite not decreased [Blurred Vision] : no blurred vision [Neck Pain] : no neck pain [Shortness Of Breath] : no shortness of breath [SOB on Exertion] : no shortness of breath on exertion [Nausea] : no nausea [Constipation] : no constipation [Abdominal Pain] : no abdominal pain [Vomiting] : no vomiting [Diarrhea] : no diarrhea [Muscle Weakness] : no muscle weakness [Headaches] : no headaches [Dizziness] : no dizziness [Tremors] : no tremors

## 2023-06-08 NOTE — ADDENDUM
[FreeTextEntry1] : 45 minutes spent the time noted on the day of this patient encounter preparing for, reviewing records, providing and documenting the above E/M service and 50% of time spent face to face counseling and education provided to patient on disease course, and treatment/management. All questions and concerns were answered and addressed in detail.

## 2023-06-08 NOTE — ASSESSMENT
[Diabetes Foot Care] : diabetes foot care [Carbohydrate Consistent Diet] : carbohydrate consistent diet [Hypoglycemia Management] : hypoglycemia management [Self Monitoring of Blood Glucose] : self monitoring of blood glucose [Insulin Self-Administration] : insulin self-administration [Retinopathy Screening] : Patient was referred to ophthalmology for retinopathy screening [FreeTextEntry1] : 1) Poorly controlled Type 2 Diabetes\par HBA1C has improved from past however still above goal 8.8%\par Patient reports fasting and postprandial BS are elevated\par \par PLAN:\par - continue Metformin 2000mg PO BID\par - Increase Jardiance to 25 mg daily\par - Increase Basaglar to 25 units at bedtime. Discussed that if the fasting BS are still elevated, she can increase the basaglar. If FBS <90, she should decrease the Basaglar to 20 units at bedtime\par - Decrease Glimepiride 2 mg once a day. \par - Continue Losartan 50 mg daily\par - Does not take statin. \par - Discussed with patient that will plan to eventually stop the glimepiride and start DPP-4 or GLP-1 agonist in next few months. \par - Advised patient to measure sugars before meals and bedtime and have a log book of the numbers. Patient to bring log book prior to next Endocrine appointment\par -Up-to-date with Opthalmology, Will see in Dec 2023\par - Medication compliance re-emphasized.\par - Encouraged self-feet examination /proper shoes use ; Is not interested in seeing Podiatrist\par -Discussed to repeat the Lipid profile in Aug, if LDL will still be above goal, will high recommend to start statin\par \par 2) Elevated BP:\par Patient states that her BP is usually high when she is stressed at the doctor's office  otherwise she does not have hx of HTN and her BP is normal at home. \par SBP x 2 in office was in 150s\par Chart review shows that patient's BP is usually normal last two visits\par Discussed with patient to monitor the BP at home, if its SBP >140 consecutively, she should ask her PCP to adjust the medication\par Continue Losartan 50 mg daily\par Discussed Jardiance can also decrease the BP therefore increase dose of Jardiance may help Rx her BP\par \par Lab work before the next visit\par \par RTC in Aug/ Sept 2023\par

## 2023-06-08 NOTE — HISTORY OF PRESENT ILLNESS
[FreeTextEntry1] : 52 year old female patient with history of Type 2 Diabetes Mellitus with hyperglycemia, Iron Deficiency Anemia, Cervical Radiculopathy, Allergic Dermatitis, Vitamin B12 Deficiency, Vitamin D Deficiency, Microalbuminuria came for type 2 diabetes follow up\par  \par Diabetes diagnosed for 8 years ago\par Medication regimen: Metformin 2000 mg daily, Glimepiride 4 mg BID, Stopped glipizide recently, Jardiance 10 mg daily, Basaglar 18 mg once at bedtime, Losartan 50 mg daily, gabapentin 100 mg daily. \par Recent fingersticks:Fasting BS: 159, 156,180, Postprandial BS: 200, 300, Reports during stress , her BS rises to 200s\par Hypoglycemic events: None\par Diet: \par BF: Besin, Dosa, Biscuits, tea without sugar, with milk \par Lunch: Millets, Vegetables, peas\par Dinner: varies, almost same\par Exercise: Walking\par Opthalmology appointment: In Dec 2022, denies retinopathy\par Peripheral Neuropathy: Yes, takes gabapentin.\par CVD: None\par Infections: None\par Vaccinations: Has received Covid vaccines, Does not want influenza vaccines, Has not taken pneumonia\par  \par

## 2023-06-09 PROBLEM — S82.841A CLOSED BIMALLEOLAR FRACTURE OF RIGHT ANKLE, INITIAL ENCOUNTER: Status: ACTIVE | Noted: 2023-02-03

## 2023-06-12 LAB
GLUCOSE BLDC GLUCOMTR-MCNC: 219
HBA1C MFR BLD HPLC: 8.8

## 2023-06-14 ENCOUNTER — APPOINTMENT (OUTPATIENT)
Dept: ENDOCRINOLOGY | Facility: CLINIC | Age: 53
End: 2023-06-14

## 2023-06-20 RX ORDER — ISOPROPYL ALCOHOL 70 ML/100ML
SWAB TOPICAL
Qty: 1 | Refills: 5 | Status: ACTIVE | COMMUNITY
Start: 2023-06-20 | End: 1900-01-01

## 2023-08-12 ENCOUNTER — APPOINTMENT (OUTPATIENT)
Dept: INTERNAL MEDICINE | Facility: CLINIC | Age: 53
End: 2023-08-12
Payer: MEDICAID

## 2023-08-12 VITALS
HEART RATE: 85 BPM | TEMPERATURE: 97.7 F | SYSTOLIC BLOOD PRESSURE: 118 MMHG | HEIGHT: 62 IN | WEIGHT: 158 LBS | OXYGEN SATURATION: 97 % | BODY MASS INDEX: 29.08 KG/M2 | DIASTOLIC BLOOD PRESSURE: 76 MMHG

## 2023-08-12 PROCEDURE — 99213 OFFICE O/P EST LOW 20 MIN: CPT

## 2023-08-12 NOTE — HEALTH RISK ASSESSMENT
[No] : In the past 12 months have you used drugs other than those required for medical reasons? No [No falls in past year] : Patient reported no falls in the past year [0] : 2) Feeling down, depressed, or hopeless: Not at all (0) [Never] : Never [de-identified] : Patient was recently evaluated by ENDO/ORTHO, findings and recommendations reviewed with the patient during today's examination. [[id number]] : 0

## 2023-08-12 NOTE — PHYSICAL EXAM
[Comprehensive Foot Exam Normal] : Right and left foot were examined and both feet are normal. No ulcers in either foot. Toes are normal and with full ROM.  Normal tactile sensation with monofilament testing throughout both feet [TextEntry] : PULMONARY:  No respiratory distress and lungs were clear to auscultation bilaterally. HEART:  Heart rate was normal and rhythm regular, normal S1 and S2, no gallops/murmur/pericardial rub. VASCULAR:  No peripheral edema. ABDOMEN:  Normal bowel sounds, soft, non-tender, no hepatosplenomegaly and no abdominal mass palpated. NEUROLOGICAL: Normal gait and reflexes, no focal deficits.

## 2023-08-12 NOTE — HISTORY OF PRESENT ILLNESS
[de-identified] : 53 year old  female patient with history of stable Essential Hypertension, Type 2 Diabetes Mellitus with hyperglycemia, Iron Deficiency Anemia, Cervical Radiculopathy, Allergic Dermatitis, Vitamin B12 Deficiency, Vitamin D Deficiency, Microalbuminuria, history as stated, presented for follow up examination. Patient is compliant with all medications. ROS as stated.\par

## 2023-08-14 RX ORDER — INSULIN GLARGINE 100 [IU]/ML
100 INJECTION, SOLUTION SUBCUTANEOUS AT BEDTIME
Qty: 12 | Refills: 0 | Status: COMPLETED | COMMUNITY
Start: 2023-03-08 | End: 2023-08-14

## 2023-08-14 RX ORDER — INSULIN GLARGINE 100 [IU]/ML
100 INJECTION, SOLUTION SUBCUTANEOUS AT BEDTIME
Qty: 20 | Refills: 2 | Status: COMPLETED | COMMUNITY
Start: 2023-02-03 | End: 2023-08-14

## 2023-08-14 RX ORDER — INSULIN GLARGINE 100 [IU]/ML
100 INJECTION, SOLUTION SUBCUTANEOUS
Qty: 8 | Refills: 0 | Status: ACTIVE | COMMUNITY
Start: 2023-08-14 | End: 1900-01-01

## 2023-08-31 ENCOUNTER — APPOINTMENT (OUTPATIENT)
Dept: ENDOCRINOLOGY | Facility: CLINIC | Age: 53
End: 2023-08-31
Payer: MEDICAID

## 2023-08-31 VITALS
OXYGEN SATURATION: 99 % | BODY MASS INDEX: 28.52 KG/M2 | TEMPERATURE: 97.1 F | RESPIRATION RATE: 16 BRPM | SYSTOLIC BLOOD PRESSURE: 153 MMHG | HEIGHT: 62 IN | WEIGHT: 155 LBS | HEART RATE: 72 BPM | DIASTOLIC BLOOD PRESSURE: 89 MMHG

## 2023-08-31 LAB
ALBUMIN SERPL ELPH-MCNC: 4.4 G/DL
ALP BLD-CCNC: 61 U/L
ALT SERPL-CCNC: 20 U/L
ANION GAP SERPL CALC-SCNC: 13 MMOL/L
AST SERPL-CCNC: 18 U/L
BILIRUB SERPL-MCNC: 0.2 MG/DL
BUN SERPL-MCNC: 9 MG/DL
CALCIUM SERPL-MCNC: 9.7 MG/DL
CHLORIDE SERPL-SCNC: 105 MMOL/L
CHOLEST SERPL-MCNC: 170 MG/DL
CO2 SERPL-SCNC: 22 MMOL/L
CREAT SERPL-MCNC: 0.53 MG/DL
CREAT SPEC-SCNC: 49 MG/DL
EGFR: 110 ML/MIN/1.73M2
ESTIMATED AVERAGE GLUCOSE: 192 MG/DL
GLUCOSE BLDC GLUCOMTR-MCNC: 168
GLUCOSE SERPL-MCNC: 131 MG/DL
HBA1C MFR BLD HPLC: 8.3 %
HDLC SERPL-MCNC: 36 MG/DL
LDLC SERPL CALC-MCNC: 105 MG/DL
MICROALBUMIN 24H UR DL<=1MG/L-MCNC: 5 MG/DL
MICROALBUMIN/CREAT 24H UR-RTO: 102 MG/G
NONHDLC SERPL-MCNC: 134 MG/DL
POTASSIUM SERPL-SCNC: 4.8 MMOL/L
PROT SERPL-MCNC: 7.4 G/DL
SODIUM SERPL-SCNC: 140 MMOL/L
TRIGL SERPL-MCNC: 166 MG/DL

## 2023-08-31 PROCEDURE — 82962 GLUCOSE BLOOD TEST: CPT

## 2023-08-31 PROCEDURE — 99215 OFFICE O/P EST HI 40 MIN: CPT | Mod: 25

## 2023-09-01 NOTE — PHYSICAL EXAM
[Alert] : alert [Well Nourished] : well nourished [No Acute Distress] : no acute distress [Well Developed] : well developed [Normal Sclera/Conjunctiva] : normal sclera/conjunctiva [EOMI] : extra ocular movement intact [No Proptosis] : no proptosis [Normal Oropharynx] : the oropharynx was normal [Thyroid Not Enlarged] : the thyroid was not enlarged [No Thyroid Nodules] : no palpable thyroid nodules [No Respiratory Distress] : no respiratory distress [No Accessory Muscle Use] : no accessory muscle use [Clear to Auscultation] : lungs were clear to auscultation bilaterally [Normal S1, S2] : normal S1 and S2 [Normal Rate] : heart rate was normal [Regular Rhythm] : with a regular rhythm [No Edema] : no peripheral edema [Pedal Pulses Normal] : the pedal pulses are present [Normal Bowel Sounds] : normal bowel sounds [Not Tender] : non-tender [Not Distended] : not distended [Soft] : abdomen soft [Normal Anterior Cervical Nodes] : no anterior cervical lymphadenopathy [Normal Posterior Cervical Nodes] : no posterior cervical lymphadenopathy [No Spinal Tenderness] : no spinal tenderness [Spine Straight] : spine straight [No Stigmata of Cushings Syndrome] : no stigmata of Cushings Syndrome [Normal Gait] : normal gait [Normal Strength/Tone] : muscle strength and tone were normal [No Rash] : no rash [Normal Reflexes] : deep tendon reflexes were 2+ and symmetric [No Tremors] : no tremors [Oriented x3] : oriented to person, place, and time [No LAD] : no lymphadenopathy [Supple] : the neck was supple [Acanthosis Nigricans] : no acanthosis nigricans

## 2023-09-01 NOTE — ADDENDUM
[FreeTextEntry1] : 54  minutes spent the time noted on the day of this patient encounter preparing for, reviewing records, providing and documenting the above E/M service and 50% of time spent face to face counseling and education provided to patient on disease course, and treatment/management. All questions and concerns were answered and addressed in detail.

## 2023-09-01 NOTE — ASSESSMENT
[FreeTextEntry1] : 1) Poorly controlled Type 2 Diabetes 2) Hyperlipidemia HBA1C has improved from past however still above goal 8.3% Patient reports fasting and postprandial BS are elevated, occurs due to stress Currently on Glimepride 2 mg daily , discussed that the will slowly stop the glimepiride due to adverse effects of weight gain, hypoglycemia, and pancreas burnout;  Microalbuminuria is improving however still abnormal, Currently on Losartan and Jardiance  PLAN: - continue Metformin 2000mg PO BID - Advised to decrease the Glimipiride 1/2 tab daily and after one month stop the glimepiride - Start Januvia 25 mg daily. Risk/ Benefits reviewed - Continue Jardiance to 25 mg daily - Continue Basaglar 27 units at bedtime.  - Continue Losartan 50 mg daily - Start Atorvastatin 10 mg daily, as LDL is above goal,  Risk/ Benefits reviewed - Advised patient to measure sugars randomly twice a day and have a log book of the numbers. Patient to bring log book prior to next Endocrine appointment -Up-to-date with Opthalmology, Will see in Dec 2023 - Medication compliance re-emphasized. - Encouraged self-feet examination /proper shoes use ; Is not interested in seeing Podiatrist  2) Elevated BP: Patient states that her BP is usually high when she is stressed at the doctor's office otherwise she does not have hx of HTN and her BP is normal at home. Chart review shows that patient's BP is usually normal last two visits Discussed with patient to monitor the BP at home, if its SBP >140 consecutively, she should ask her PCP to adjust the medication Continue Losartan 50 mg daily     Lab work before the next visit    RTC in Aug/ Sept 2023

## 2023-09-01 NOTE — REVIEW OF SYSTEMS
[Pain/Numbness of Digits] : pain/numbness of digits [Polydipsia] : polydipsia [Polyuria] : polyuria [Fatigue] : no fatigue [Decreased Appetite] : appetite not decreased [Recent Weight Gain (___ Lbs)] : no recent weight gain [Recent Weight Loss (___ Lbs)] : no recent weight loss [Blurred Vision] : no blurred vision [Dysphagia] : no dysphagia [Neck Pain] : no neck pain [Dysphonia] : no dysphonia [Chest Pain] : no chest pain [Palpitations] : no palpitations [Lower Ext Edema] : no lower extremity edema [Shortness Of Breath] : no shortness of breath [Cough] : no cough [SOB on Exertion] : no shortness of breath on exertion [Nausea] : no nausea [Constipation] : no constipation [Abdominal Pain] : no abdominal pain [Vomiting] : no vomiting [Diarrhea] : no diarrhea [Joint Pain] : no joint pain [Headaches] : no headaches [Dizziness] : no dizziness [Tremors] : no tremors [Insomnia] : no insomnia [Cold Intolerance] : no cold intolerance [Heat Intolerance] : no heat intolerance

## 2023-09-01 NOTE — HISTORY OF PRESENT ILLNESS
[FreeTextEntry1] : 52 year old female patient with history of Type 2 Diabetes Mellitus with hyperglycemia, Iron Deficiency Anemia, Cervical Radiculopathy, Allergic Dermatitis, Vitamin B12 Deficiency, Vitamin D Deficiency, Microalbuminuria came for type 2 diabetes follow up  Diabetes HX: Diabetes diagnosed for 8 years ago Medication regimen: Metformin 2000 mg daily, Glimepiride 2 mg daily , Stopped glipizide recently, Jardiance 25 mg daily, Basaglar 27 mg once at bedtime, Losartan 50 mg daily, gabapentin 100 mg daily. Recent fingersticks: Check randomly, FBS: 130-140 Reports during stress, her BS rises to 200s Hypoglycemic events: None Diet: BF: toast, 2 crackers,  Lunch: Millets, Vegetables, peas Dinner: green vegetable, millet/ wheat bread Exercise: does Walking Opthalmology appointment: In June 2023, denies retinopathy. Peripheral Neuropathy: Yes, takes gabapentin. CVD: None Infections: None Vaccinations: Has received Covid vaccines, Does not want influenza vaccines, Has not taken pneumonia

## 2023-09-11 ENCOUNTER — TRANSCRIPTION ENCOUNTER (OUTPATIENT)
Age: 53
End: 2023-09-11

## 2023-09-22 ENCOUNTER — TRANSCRIPTION ENCOUNTER (OUTPATIENT)
Age: 53
End: 2023-09-22

## 2023-09-27 ENCOUNTER — TRANSCRIPTION ENCOUNTER (OUTPATIENT)
Age: 53
End: 2023-09-27

## 2023-11-08 ENCOUNTER — RX RENEWAL (OUTPATIENT)
Age: 53
End: 2023-11-08

## 2023-11-27 ENCOUNTER — APPOINTMENT (OUTPATIENT)
Dept: INTERNAL MEDICINE | Facility: CLINIC | Age: 53
End: 2023-11-27
Payer: MEDICAID

## 2023-11-27 ENCOUNTER — NON-APPOINTMENT (OUTPATIENT)
Age: 53
End: 2023-11-27

## 2023-11-27 VITALS
BODY MASS INDEX: 28.89 KG/M2 | HEIGHT: 62 IN | SYSTOLIC BLOOD PRESSURE: 104 MMHG | WEIGHT: 157 LBS | OXYGEN SATURATION: 98 % | HEART RATE: 88 BPM | RESPIRATION RATE: 16 BRPM | DIASTOLIC BLOOD PRESSURE: 65 MMHG | TEMPERATURE: 98.2 F

## 2023-11-27 DIAGNOSIS — Z00.00 ENCOUNTER FOR GENERAL ADULT MEDICAL EXAMINATION W/OUT ABNORMAL FINDINGS: ICD-10-CM

## 2023-11-27 PROCEDURE — 93000 ELECTROCARDIOGRAM COMPLETE: CPT

## 2023-11-27 PROCEDURE — 99396 PREV VISIT EST AGE 40-64: CPT | Mod: 25

## 2023-11-29 ENCOUNTER — TRANSCRIPTION ENCOUNTER (OUTPATIENT)
Age: 53
End: 2023-11-29

## 2023-11-29 RX ORDER — ELECTROLYTES/DEXTROSE
32G X 4 MM SOLUTION, ORAL ORAL
Qty: 1 | Refills: 2 | Status: ACTIVE | COMMUNITY
Start: 2023-03-08 | End: 1900-01-01

## 2023-11-29 RX ORDER — BLOOD SUGAR DIAGNOSTIC
STRIP MISCELLANEOUS 3 TIMES DAILY
Qty: 1 | Refills: 0 | Status: ACTIVE | COMMUNITY
Start: 2023-03-08 | End: 1900-01-01

## 2023-12-26 ENCOUNTER — RX RENEWAL (OUTPATIENT)
Age: 53
End: 2023-12-26

## 2024-01-09 ENCOUNTER — RX RENEWAL (OUTPATIENT)
Age: 54
End: 2024-01-09

## 2024-01-11 ENCOUNTER — APPOINTMENT (OUTPATIENT)
Dept: ENDOCRINOLOGY | Facility: CLINIC | Age: 54
End: 2024-01-11
Payer: MEDICAID

## 2024-01-11 VITALS
RESPIRATION RATE: 16 BRPM | OXYGEN SATURATION: 99 % | HEART RATE: 84 BPM | WEIGHT: 157 LBS | TEMPERATURE: 98.3 F | DIASTOLIC BLOOD PRESSURE: 90 MMHG | HEIGHT: 62 IN | BODY MASS INDEX: 28.89 KG/M2 | SYSTOLIC BLOOD PRESSURE: 162 MMHG

## 2024-01-11 DIAGNOSIS — E11.65 TYPE 2 DIABETES MELLITUS WITH HYPERGLYCEMIA: ICD-10-CM

## 2024-01-11 PROCEDURE — 99215 OFFICE O/P EST HI 40 MIN: CPT

## 2024-01-11 PROCEDURE — 82962 GLUCOSE BLOOD TEST: CPT

## 2024-01-11 NOTE — ADDENDUM
[FreeTextEntry1] :  minutes spent the time noted on the day of this patient encounter preparing for, reviewing records, providing and documenting the above E/M service and 50% of time spent face to face counseling and education provided to patient on disease course, and treatment/management. All questions and concerns were answered and addressed in detail.

## 2024-01-11 NOTE — PHYSICAL EXAM
[Alert] : alert [Well Nourished] : well nourished [No Acute Distress] : no acute distress [Well Developed] : well developed [Normal Sclera/Conjunctiva] : normal sclera/conjunctiva [EOMI] : extra ocular movement intact [No Proptosis] : no proptosis [Normal Oropharynx] : the oropharynx was normal [No LAD] : no lymphadenopathy [Supple] : the neck was supple [Thyroid Not Enlarged] : the thyroid was not enlarged [No Thyroid Nodules] : no palpable thyroid nodules [No Respiratory Distress] : no respiratory distress [No Accessory Muscle Use] : no accessory muscle use [Clear to Auscultation] : lungs were clear to auscultation bilaterally [Normal S1, S2] : normal S1 and S2 [Normal Rate] : heart rate was normal [Regular Rhythm] : with a regular rhythm [No Edema] : no peripheral edema [Pedal Pulses Normal] : the pedal pulses are present [Normal Bowel Sounds] : normal bowel sounds [Not Tender] : non-tender [Not Distended] : not distended [Soft] : abdomen soft [Normal Anterior Cervical Nodes] : no anterior cervical lymphadenopathy [No Spinal Tenderness] : no spinal tenderness [Spine Straight] : spine straight [No Stigmata of Cushings Syndrome] : no stigmata of Cushings Syndrome [Normal Gait] : normal gait [Normal Strength/Tone] : muscle strength and tone were normal [No Rash] : no rash [Normal Reflexes] : deep tendon reflexes were 2+ and symmetric [No Tremors] : no tremors [Oriented x3] : oriented to person, place, and time [Acanthosis Nigricans] : no acanthosis nigricans

## 2024-01-11 NOTE — HISTORY OF PRESENT ILLNESS
[FreeTextEntry1] : 53 year old female patient with history of Type 2 Diabetes Mellitus with hyperglycemia, Iron Deficiency Anemia, Cervical Radiculopathy, Allergic Dermatitis, Vitamin B12 Deficiency, Vitamin D Deficiency, Microalbuminuria came for type 2 diabetes follow up  Diabetes HX: Diabetes diagnosed for 8 years ago Medication regimen: Metformin 2000 mg daily, Glimepiride 2 mg (was taking on alternate days) , Stopped glipizide recently, Jardiance 25 mg daily, Basaglar 25 mg once at bedtime, Januvia 25 daily, Losartan 50 mg daily, gabapentin 100 mg daily. Recent fingersticks: Check in the morning 100-120, sometimes in the afternoon reports blood sugar less than 180 Hypoglycemic events: None Diet: BF: toast, 2 crackers, take millet and vegetables at 10 AM Lunch: Millets, Vegetables, peas Dinner: green vegetable, millet/ wheat bread Exercise: does Walking Opthalmology appointment: In June 2023, denies retinopathy. Peripheral Neuropathy: Yes, takes gabapentin. CVD: None Infections: None Vaccinations: Has received Covid vaccines, Does not want influenza vaccines, Has not taken pneumonia

## 2024-01-11 NOTE — ASSESSMENT
[FreeTextEntry1] : 1) Poorly controlled Type 2 Diabetes 2) Hyperlipidemia HBA1C has improved from past however still above goal 8.1% Patient reports fasting and postprandial BS are elevated, occurs due to stress Microalbuminuria is improving however still abnormal, Currently on Losartan and Jardiance  PLAN: -Stop glimepiride -Increase Januvia 50 daily - continue Metformin 2000mg PO BID - Continue Jardiance to 25 mg daily - Continue Basaglar 25 units at bedtime. - Continue Losartan 50 mg daily - Atorvastatin 10 mg daily, as LDL is now at goal. - Advised patient to measure sugars randomly twice a day and have a log book of the numbers. Patient to bring log book prior to next Endocrine appointment -Up-to-date with Opthalmology, Will see in Dec 2024 - Medication compliance re-emphasized. - Encouraged self-feet examination /proper shoes use ; Is not interested in seeing Podiatrist  2) Elevated BP: Patient states that her BP is usually high when she is stressed at the doctor's office otherwise she does not have hx of HTN and her BP is normal at home. Chart review shows that patient's BP was normal Discussed with patient to monitor the BP at home, if its SBP >140 consecutively, she should ask her PCP to increase losartan. Continue Losartan 50 mg daily   Lab work before the next visit  .RTC in June 2024

## 2024-01-12 ENCOUNTER — RESULT REVIEW (OUTPATIENT)
Age: 54
End: 2024-01-12

## 2024-01-12 ENCOUNTER — APPOINTMENT (OUTPATIENT)
Dept: MAMMOGRAPHY | Facility: CLINIC | Age: 54
End: 2024-01-12
Payer: MEDICAID

## 2024-01-12 PROCEDURE — 77067 SCR MAMMO BI INCL CAD: CPT

## 2024-01-12 PROCEDURE — 77063 BREAST TOMOSYNTHESIS BI: CPT

## 2024-01-13 DIAGNOSIS — R92.8 OTHER ABNORMAL AND INCONCLUSIVE FINDINGS ON DIAGNOSTIC IMAGING OF BREAST: ICD-10-CM

## 2024-01-13 LAB
ALBUMIN SERPL ELPH-MCNC: 4.6 G/DL
ALP BLD-CCNC: 60 U/L
ALT SERPL-CCNC: 25 U/L
ANION GAP SERPL CALC-SCNC: 13 MMOL/L
AST SERPL-CCNC: 17 U/L
BILIRUB SERPL-MCNC: 0.2 MG/DL
BUN SERPL-MCNC: 9 MG/DL
CALCIUM SERPL-MCNC: 9.8 MG/DL
CHLORIDE SERPL-SCNC: 103 MMOL/L
CHOLEST SERPL-MCNC: 110 MG/DL
CO2 SERPL-SCNC: 24 MMOL/L
CREAT SERPL-MCNC: 0.52 MG/DL
CREAT SPEC-SCNC: 38 MG/DL
EGFR: 110 ML/MIN/1.73M2
ESTIMATED AVERAGE GLUCOSE: 186 MG/DL
GLUCOSE BLDC GLUCOMTR-MCNC: 154
GLUCOSE SERPL-MCNC: 147 MG/DL
HBA1C MFR BLD HPLC: 8.1 %
HDLC SERPL-MCNC: 36 MG/DL
LDLC SERPL CALC-MCNC: 54 MG/DL
MICROALBUMIN 24H UR DL<=1MG/L-MCNC: 1.6 MG/DL
MICROALBUMIN/CREAT 24H UR-RTO: 41 MG/G
NONHDLC SERPL-MCNC: 75 MG/DL
POTASSIUM SERPL-SCNC: 4.7 MMOL/L
PROT SERPL-MCNC: 7.4 G/DL
SODIUM SERPL-SCNC: 140 MMOL/L
TRIGL SERPL-MCNC: 110 MG/DL

## 2024-01-16 ENCOUNTER — APPOINTMENT (OUTPATIENT)
Dept: MAMMOGRAPHY | Facility: CLINIC | Age: 54
End: 2024-01-16
Payer: MEDICAID

## 2024-01-16 ENCOUNTER — RESULT REVIEW (OUTPATIENT)
Age: 54
End: 2024-01-16

## 2024-01-16 ENCOUNTER — APPOINTMENT (OUTPATIENT)
Dept: ULTRASOUND IMAGING | Facility: CLINIC | Age: 54
End: 2024-01-16
Payer: MEDICAID

## 2024-01-16 PROCEDURE — G0279: CPT | Mod: LT

## 2024-01-16 PROCEDURE — 77065 DX MAMMO INCL CAD UNI: CPT | Mod: LT

## 2024-01-16 PROCEDURE — 76641 ULTRASOUND BREAST COMPLETE: CPT | Mod: LT

## 2024-01-23 ENCOUNTER — RX RENEWAL (OUTPATIENT)
Age: 54
End: 2024-01-23

## 2024-01-26 ENCOUNTER — RX RENEWAL (OUTPATIENT)
Age: 54
End: 2024-01-26

## 2024-01-26 ENCOUNTER — TRANSCRIPTION ENCOUNTER (OUTPATIENT)
Age: 54
End: 2024-01-26

## 2024-01-26 RX ORDER — GABAPENTIN 100 MG/1
100 CAPSULE ORAL
Qty: 90 | Refills: 3 | Status: ACTIVE | COMMUNITY
Start: 2022-10-31 | End: 1900-01-01

## 2024-02-07 ENCOUNTER — RX RENEWAL (OUTPATIENT)
Age: 54
End: 2024-02-07

## 2024-02-16 ENCOUNTER — TRANSCRIPTION ENCOUNTER (OUTPATIENT)
Age: 54
End: 2024-02-16

## 2024-02-20 ENCOUNTER — TRANSCRIPTION ENCOUNTER (OUTPATIENT)
Age: 54
End: 2024-02-20

## 2024-02-23 ENCOUNTER — RX RENEWAL (OUTPATIENT)
Age: 54
End: 2024-02-23

## 2024-02-28 ENCOUNTER — RX RENEWAL (OUTPATIENT)
Age: 54
End: 2024-02-28

## 2024-02-29 ENCOUNTER — TRANSCRIPTION ENCOUNTER (OUTPATIENT)
Age: 54
End: 2024-02-29

## 2024-03-15 ENCOUNTER — RX RENEWAL (OUTPATIENT)
Age: 54
End: 2024-03-15

## 2024-03-16 ENCOUNTER — APPOINTMENT (OUTPATIENT)
Dept: INTERNAL MEDICINE | Facility: CLINIC | Age: 54
End: 2024-03-16
Payer: MEDICAID

## 2024-03-16 VITALS
HEIGHT: 62 IN | HEART RATE: 88 BPM | OXYGEN SATURATION: 98 % | SYSTOLIC BLOOD PRESSURE: 99 MMHG | BODY MASS INDEX: 28.71 KG/M2 | DIASTOLIC BLOOD PRESSURE: 64 MMHG | RESPIRATION RATE: 16 BRPM | WEIGHT: 156 LBS | TEMPERATURE: 98 F

## 2024-03-16 DIAGNOSIS — D50.0 IRON DEFICIENCY ANEMIA SECONDARY TO BLOOD LOSS (CHRONIC): ICD-10-CM

## 2024-03-16 DIAGNOSIS — I10 ESSENTIAL (PRIMARY) HYPERTENSION: ICD-10-CM

## 2024-03-16 DIAGNOSIS — M54.12 RADICULOPATHY, CERVICAL REGION: ICD-10-CM

## 2024-03-16 PROCEDURE — 99214 OFFICE O/P EST MOD 30 MIN: CPT | Mod: 25

## 2024-03-16 NOTE — ASSESSMENT
[FreeTextEntry1] : 54 year old female found to have stable Essential Hypertension, Type 2 Diabetes Mellitus with hyperglycemia, Hyperlipidemia, Diabetic Neuropathy, Iron Deficiency Anemia, Cervical Radiculopathy, Allergic Dermatitis, Vitamin B12 Deficiency, Vitamin D Deficiency, Microalbuminuria, with the current prescription regimen as recommended, diet and lifestyle modifications, as counseled. Prior results reviewed, interpreted and discussed with the patient during today's examination, as appropriate. Follow up, treatment plan and tests, as ordered.   Total time spent:: 30 minutes Including: Preparation prior to visit - Reviewing prior record, results of tests and Consultation Reports as applicable Conducting an appropriate H & P during today's encounter Appropriate orders for tests, medications and procedures, as applicable Counseling patient Note completion

## 2024-03-16 NOTE — HEALTH RISK ASSESSMENT
[No] : In the past 12 months have you used drugs other than those required for medical reasons? No [No falls in past year] : Patient reported no falls in the past year [0] : 2) Feeling down, depressed, or hopeless: Not at all (0) [Never] : Never [[id number]] : 0 [de-identified] : ENDO

## 2024-03-16 NOTE — HISTORY OF PRESENT ILLNESS
[de-identified] : 54 year old  female patient with history of stable Essential Hypertension, Type 2 Diabetes Mellitus with hyperglycemia, Hyperlipidemia, Diabetic Neuropathy, Iron Deficiency Anemia, Cervical Radiculopathy, Allergic Dermatitis, Vitamin B12 Deficiency, Vitamin D Deficiency, Microalbuminuria, history as stated, presented for follow up examination. Patient is compliant with all medications. ROS as stated.

## 2024-03-25 ENCOUNTER — RX RENEWAL (OUTPATIENT)
Age: 54
End: 2024-03-25

## 2024-03-26 RX ORDER — SITAGLIPTIN 25 MG/1
25 TABLET, FILM COATED ORAL
Qty: 90 | Refills: 1 | Status: ACTIVE | COMMUNITY
Start: 2024-03-26 | End: 1900-01-01

## 2024-03-27 ENCOUNTER — RX RENEWAL (OUTPATIENT)
Age: 54
End: 2024-03-27

## 2024-04-19 ENCOUNTER — RX RENEWAL (OUTPATIENT)
Age: 54
End: 2024-04-19

## 2024-04-26 ENCOUNTER — RX RENEWAL (OUTPATIENT)
Age: 54
End: 2024-04-26

## 2024-04-30 ENCOUNTER — RX RENEWAL (OUTPATIENT)
Age: 54
End: 2024-04-30

## 2024-05-17 ENCOUNTER — RX RENEWAL (OUTPATIENT)
Age: 54
End: 2024-05-17

## 2024-05-25 ENCOUNTER — LABORATORY RESULT (OUTPATIENT)
Age: 54
End: 2024-05-25

## 2024-05-29 ENCOUNTER — APPOINTMENT (OUTPATIENT)
Dept: ENDOCRINOLOGY | Facility: CLINIC | Age: 54
End: 2024-05-29
Payer: MEDICAID

## 2024-05-29 VITALS
WEIGHT: 154 LBS | DIASTOLIC BLOOD PRESSURE: 65 MMHG | SYSTOLIC BLOOD PRESSURE: 100 MMHG | HEIGHT: 62 IN | OXYGEN SATURATION: 95 % | BODY MASS INDEX: 28.34 KG/M2 | RESPIRATION RATE: 16 BRPM | TEMPERATURE: 97.7 F | HEART RATE: 87 BPM

## 2024-05-29 DIAGNOSIS — H35.00 UNSPECIFIED BACKGROUND RETINOPATHY: ICD-10-CM

## 2024-05-29 DIAGNOSIS — E78.5 HYPERLIPIDEMIA, UNSPECIFIED: ICD-10-CM

## 2024-05-29 DIAGNOSIS — E11.21 TYPE 2 DIABETES MELLITUS WITH DIABETIC NEPHROPATHY: ICD-10-CM

## 2024-05-29 DIAGNOSIS — R80.9 PROTEINURIA, UNSPECIFIED: ICD-10-CM

## 2024-05-29 DIAGNOSIS — E11.65 TYPE 2 DIABETES MELLITUS WITH HYPERGLYCEMIA: ICD-10-CM

## 2024-05-29 LAB — GLUCOSE BLDC GLUCOMTR-MCNC: 173

## 2024-05-29 PROCEDURE — 99214 OFFICE O/P EST MOD 30 MIN: CPT | Mod: 25

## 2024-05-29 PROCEDURE — 82962 GLUCOSE BLOOD TEST: CPT

## 2024-05-29 RX ORDER — LOSARTAN POTASSIUM 100 MG/1
100 TABLET, FILM COATED ORAL
Qty: 30 | Refills: 0 | Status: ACTIVE | COMMUNITY
Start: 2024-05-29 | End: 1900-01-01

## 2024-05-29 RX ORDER — GLIMEPIRIDE 4 MG/1
4 TABLET ORAL
Qty: 180 | Refills: 3 | Status: DISCONTINUED | COMMUNITY
Start: 2017-02-05 | End: 2024-05-29

## 2024-05-29 RX ORDER — SITAGLIPTIN 100 MG/1
100 TABLET, FILM COATED ORAL DAILY
Qty: 90 | Refills: 1 | Status: ACTIVE | COMMUNITY
Start: 2024-05-29 | End: 1900-01-01

## 2024-05-29 RX ORDER — SITAGLIPTIN 50 MG/1
50 TABLET, FILM COATED ORAL
Qty: 30 | Refills: 0 | Status: DISCONTINUED | COMMUNITY
Start: 2023-08-31 | End: 2024-05-29

## 2024-05-29 NOTE — ASSESSMENT
[FreeTextEntry1] : 1) Poorly controlled Type 2 Diabetes 2) Hyperlipidemia HBA1C has improved from past however still above goal 8.4% Patient reports she is following low carb diet however still A1C is going up, could be as patient has stopped glimepiride Microalbuminuria is improving however still abnormal, Currently on high dose of Losartan and Jardiance  PLAN: -Increase Januvia 100 daily - continue Metformin 2000mg PO BID - Continue Jardiance to 25 mg daily - Continue Basaglar 25 units at bedtime. - Continue Losartan 100 mg daily for microalbuminuria - Atorvastatin 10 mg daily, as LDL is now at goal. - Advised patient to measure sugars randomly twice a day and have a log book of the numbers. Patient to bring log book prior to next Endocrine appointment -Up-to-date with Opthalmology, Will see in Dec 2024 - Medication compliance re-emphasized. - Encouraged self-feet examination /proper shoes use ; Is not interested in seeing Podiatrist  2) HTN: BP better controlled on high dose of losartan  Continue Losartan 100 mg daily  Lab work before the next visit  .RTC in Sept 2024.

## 2024-05-29 NOTE — REVIEW OF SYSTEMS
[Decreased Appetite] : decreased appetite [Recent Weight Gain (___ Lbs)] : recent weight gain: [unfilled] lbs [All other systems negative] : All other systems negative [Fatigue] : no fatigue [Blurred Vision] : no blurred vision [Neck Pain] : no neck pain [Chest Pain] : no chest pain [Palpitations] : no palpitations [Shortness Of Breath] : no shortness of breath [SOB on Exertion] : no shortness of breath on exertion [Nausea] : no nausea [Constipation] : no constipation [Vomiting] : no vomiting [Diarrhea] : no diarrhea [Polyuria] : no polyuria [FreeTextEntry6] : Runny nose

## 2024-05-29 NOTE — HISTORY OF PRESENT ILLNESS
[FreeTextEntry1] : 53 year old female patient with history of Type 2 Diabetes Mellitus with hyperglycemia, Iron Deficiency Anemia, Cervical Radiculopathy, Allergic Dermatitis, Vitamin B12 Deficiency, Vitamin D Deficiency, Microalbuminuria came for type 2 diabetes follow up  Diabetes HX: Diabetes diagnosed for 8 years ago Medication regimen: Metformin 2000 mg daily, stopped Glimepiride few months ago, Jardiance 25 mg daily, Basaglar 25 mg once at bedtime, Januvia 25 daily, Losartan 50 mg daily, gabapentin 100 mg daily. Recent fingersticks: Check in the morning 100-120, sometimes in the evening 115 Hypoglycemic events: None Diet: BF: toast, 2 crackers, take millet and vegetables at 10 AM Lunch: Millets, Vegetables, peas Dinner: green vegetable, millet/ wheat bread Exercise: does Walking Opthalmology appointment: In June 2023, denies retinopathy. Due this appointment  Peripheral Neuropathy: Yes, takes gabapentin. CVD: None Infections: None Vaccinations: Has received Covid vaccines, does not want influenza vaccines, Has not taken pneumonia

## 2024-06-04 ENCOUNTER — RX RENEWAL (OUTPATIENT)
Age: 54
End: 2024-06-04

## 2024-06-04 RX ORDER — BLOOD SUGAR DIAGNOSTIC
STRIP MISCELLANEOUS
Qty: 100 | Refills: 0 | Status: ACTIVE | COMMUNITY
Start: 2024-01-09 | End: 1900-01-01

## 2024-06-08 ENCOUNTER — RX RENEWAL (OUTPATIENT)
Age: 54
End: 2024-06-08

## 2024-06-08 RX ORDER — ATORVASTATIN CALCIUM 20 MG/1
20 TABLET, FILM COATED ORAL
Qty: 90 | Refills: 0 | Status: ACTIVE | COMMUNITY
Start: 2023-08-31 | End: 1900-01-01

## 2024-06-23 ENCOUNTER — RX RENEWAL (OUTPATIENT)
Age: 54
End: 2024-06-23

## 2024-06-23 RX ORDER — EMPAGLIFLOZIN 25 MG/1
25 TABLET, FILM COATED ORAL DAILY
Qty: 30 | Refills: 0 | Status: ACTIVE | COMMUNITY
Start: 2023-03-09 | End: 1900-01-01

## 2024-07-16 ENCOUNTER — TRANSCRIPTION ENCOUNTER (OUTPATIENT)
Age: 54
End: 2024-07-16

## 2024-07-20 ENCOUNTER — APPOINTMENT (OUTPATIENT)
Dept: INTERNAL MEDICINE | Facility: CLINIC | Age: 54
End: 2024-07-20
Payer: MEDICAID

## 2024-07-20 VITALS
TEMPERATURE: 97.6 F | DIASTOLIC BLOOD PRESSURE: 70 MMHG | HEIGHT: 62 IN | HEART RATE: 86 BPM | BODY MASS INDEX: 28.16 KG/M2 | RESPIRATION RATE: 16 BRPM | SYSTOLIC BLOOD PRESSURE: 104 MMHG | WEIGHT: 153 LBS | OXYGEN SATURATION: 97 %

## 2024-07-20 DIAGNOSIS — E11.65 TYPE 2 DIABETES MELLITUS WITH HYPERGLYCEMIA: ICD-10-CM

## 2024-07-20 DIAGNOSIS — E53.8 DEFICIENCY OF OTHER SPECIFIED B GROUP VITAMINS: ICD-10-CM

## 2024-07-20 DIAGNOSIS — E78.5 HYPERLIPIDEMIA, UNSPECIFIED: ICD-10-CM

## 2024-07-20 DIAGNOSIS — D50.0 IRON DEFICIENCY ANEMIA SECONDARY TO BLOOD LOSS (CHRONIC): ICD-10-CM

## 2024-07-20 DIAGNOSIS — I10 ESSENTIAL (PRIMARY) HYPERTENSION: ICD-10-CM

## 2024-07-20 DIAGNOSIS — E11.21 TYPE 2 DIABETES MELLITUS WITH DIABETIC NEPHROPATHY: ICD-10-CM

## 2024-07-20 PROCEDURE — 99214 OFFICE O/P EST MOD 30 MIN: CPT

## 2024-07-24 ENCOUNTER — TRANSCRIPTION ENCOUNTER (OUTPATIENT)
Age: 54
End: 2024-07-24

## 2024-07-29 NOTE — PHYSICAL THERAPY INITIAL EVALUATION ADULT - LEVEL OF INDEPENDENCE: BED TO CHAIR, REHAB EVAL
75-year-old female presents today complaining of pain to her left fourth digit.  On Friday, 3 days ago, the patient noted a red area with a central large pustule.  She did not get evaluated at that time and notes that today it is more swollen with more pus.  She is able to move her finger.  She denies fever or chills, numbness tingling or weakness.  She has a history of diabetes and hypertension.  Her home health aide also notes that last Monday, 1 week ago, she had 2 isolated dark bowel movements but that yesterday her stool had returned to normal.  She denies lightheadedness dizziness shortness of breath or chest pain. supervision

## 2024-07-30 ENCOUNTER — RX RENEWAL (OUTPATIENT)
Age: 54
End: 2024-07-30

## 2024-09-05 ENCOUNTER — RX RENEWAL (OUTPATIENT)
Age: 54
End: 2024-09-05

## 2024-09-06 ENCOUNTER — RX RENEWAL (OUTPATIENT)
Age: 54
End: 2024-09-06

## 2024-09-12 ENCOUNTER — APPOINTMENT (OUTPATIENT)
Dept: ENDOCRINOLOGY | Facility: CLINIC | Age: 54
End: 2024-09-12
Payer: MEDICAID

## 2024-09-12 VITALS
WEIGHT: 150 LBS | HEART RATE: 91 BPM | TEMPERATURE: 97.8 F | SYSTOLIC BLOOD PRESSURE: 113 MMHG | OXYGEN SATURATION: 96 % | DIASTOLIC BLOOD PRESSURE: 70 MMHG | BODY MASS INDEX: 27.6 KG/M2 | HEIGHT: 62 IN | RESPIRATION RATE: 16 BRPM

## 2024-09-12 DIAGNOSIS — E11.21 TYPE 2 DIABETES MELLITUS WITH DIABETIC NEPHROPATHY: ICD-10-CM

## 2024-09-12 DIAGNOSIS — E11.65 TYPE 2 DIABETES MELLITUS WITH HYPERGLYCEMIA: ICD-10-CM

## 2024-09-12 DIAGNOSIS — H35.00 UNSPECIFIED BACKGROUND RETINOPATHY: ICD-10-CM

## 2024-09-12 DIAGNOSIS — E78.5 HYPERLIPIDEMIA, UNSPECIFIED: ICD-10-CM

## 2024-09-12 LAB — GLUCOSE BLDC GLUCOMTR-MCNC: 184

## 2024-09-12 PROCEDURE — 99214 OFFICE O/P EST MOD 30 MIN: CPT | Mod: 25

## 2024-09-12 PROCEDURE — 82962 GLUCOSE BLOOD TEST: CPT

## 2024-09-12 PROCEDURE — G2211 COMPLEX E/M VISIT ADD ON: CPT | Mod: NC

## 2024-09-15 NOTE — REVIEW OF SYSTEMS
[Recent Weight Loss (___ Lbs)] : recent weight loss: [unfilled] lbs [Fatigue] : no fatigue [Decreased Appetite] : appetite not decreased [Recent Weight Gain (___ Lbs)] : no recent weight gain [Blurred Vision] : no blurred vision [Dysphagia] : no dysphagia [Neck Pain] : no neck pain [Chest Pain] : no chest pain [Palpitations] : no palpitations [Lower Ext Edema] : no lower extremity edema [Shortness Of Breath] : no shortness of breath [SOB on Exertion] : no shortness of breath on exertion [Nausea] : no nausea [Constipation] : no constipation [Abdominal Pain] : no abdominal pain [Vomiting] : no vomiting [Diarrhea] : no diarrhea [Polyuria] : no polyuria [Acanthosis] : no acanthosis  [Acne] : no acne [Headaches] : no headaches [Dizziness] : no dizziness [Tremors] : no tremors [Pain/Numbness of Digits] : no pain/numbness of digits [Polydipsia] : no polydipsia [Cold Intolerance] : no cold intolerance

## 2024-09-15 NOTE — ASSESSMENT
[FreeTextEntry1] : 1) Poorly controlled Type 2 Diabetes 2) Hyperlipidemia HBA1C has improved from past however still above goal 7.9% Microalbuminuria is improving however still abnormal, Currently on high dose of Losartan and Jardiance  PLAN: - Continue Januvia 100 daily - continue Metformin 2000mg PO BID - Continue Jardiance to 25 mg daily - Continue Basaglar 25 units at bedtime. - Continue Losartan 100 mg daily for microalbuminuria - Atorvastatin 10 mg daily, as LDL is now at goal. However HDL is low - Advised patient to measure sugars randomly twice a day and have a log book of the numbers. Patient to bring log book prior to next Endocrine appointment -Up-to-date with Opthalmology,  - Medication compliance re-emphasized. - Encouraged self-feet examination /proper shoes use ; Is not interested in seeing Podiatrist  2) HTN: BP better controlled on high dose of losartan Continue Losartan 100 mg daily  Lab work before the next visit  .RTC in Sept 2024.

## 2024-09-15 NOTE — HISTORY OF PRESENT ILLNESS
[FreeTextEntry1] : 54 year old female patient with history of Type 2 Diabetes Mellitus with hyperglycemia, Iron Deficiency Anemia, Cervical Radiculopathy, Allergic Dermatitis, Vitamin B12 Deficiency, Vitamin D Deficiency, Microalbuminuria came for type 2 diabetes follow up  Diabetes HX: Diabetes diagnosed for 8 years ago Medication regimen: Metformin 2000 mg daily, stopped Glimepiride few months ago, Jardiance 25 mg daily, Basaglar 25 mg once at bedtime, Januvia 100 daily, Losartan 100 mg daily, gabapentin 100 mg daily. Recent fingersticks: Check in the morning 100-120, sometimes in the evening 100-115, 135, the maximum BS is 170 Hypoglycemic events: None Diet: Patient has reduced carb intake shaun bread BF: toast, 2 crackers, take millet and vegetables at 10 AM Lunch: Millets, Vegetables, peas Dinner: green vegetable, millet/ wheat bread ( now reduced to 1 roti) Exercise: does Walking every 20-24 min Opthalmology appointment: Has an appointment next week. denies retinopathy. Due this appointment Peripheral Neuropathy: Yes, takes gabapentin. CVD: None Infections: None Vaccinations: Has received Covid vaccines, does not want influenza vaccines, Has not taken pneumonia

## 2024-10-03 ENCOUNTER — TRANSCRIPTION ENCOUNTER (OUTPATIENT)
Age: 54
End: 2024-10-03
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
## 2024-12-17 ENCOUNTER — RX RENEWAL (OUTPATIENT)
Age: 54
End: 2024-12-17

## 2024-12-20 ENCOUNTER — TRANSCRIPTION ENCOUNTER (OUTPATIENT)
Age: 54
End: 2024-12-20

## 2024-12-20 RX ORDER — INSULIN GLARGINE-YFGN 100 [IU]/ML
100 INJECTION, SOLUTION SUBCUTANEOUS
Qty: 5 | Refills: 0 | Status: ACTIVE | COMMUNITY
Start: 2024-12-20 | End: 1900-01-01

## 2024-12-31 RX ORDER — INSULIN GLARGINE 100 [IU]/ML
100 INJECTION, SOLUTION SUBCUTANEOUS
Qty: 20 | Refills: 0 | Status: ACTIVE | COMMUNITY
Start: 2024-12-31 | End: 1900-01-01

## 2025-01-01 ENCOUNTER — TRANSCRIPTION ENCOUNTER (OUTPATIENT)
Age: 55
End: 2025-01-01

## 2025-01-11 ENCOUNTER — APPOINTMENT (OUTPATIENT)
Dept: INTERNAL MEDICINE | Facility: CLINIC | Age: 55
End: 2025-01-11
Payer: MEDICAID

## 2025-01-11 ENCOUNTER — NON-APPOINTMENT (OUTPATIENT)
Age: 55
End: 2025-01-11

## 2025-01-11 VITALS
WEIGHT: 151 LBS | HEART RATE: 81 BPM | BODY MASS INDEX: 27.79 KG/M2 | SYSTOLIC BLOOD PRESSURE: 122 MMHG | HEIGHT: 62 IN | RESPIRATION RATE: 16 BRPM | DIASTOLIC BLOOD PRESSURE: 78 MMHG | OXYGEN SATURATION: 99 % | TEMPERATURE: 98.2 F

## 2025-01-11 DIAGNOSIS — Z12.39 ENCOUNTER FOR OTHER SCREENING FOR MALIGNANT NEOPLASM OF BREAST: ICD-10-CM

## 2025-01-11 DIAGNOSIS — S99.921A UNSPECIFIED INJURY OF RIGHT FOOT, INITIAL ENCOUNTER: ICD-10-CM

## 2025-01-11 DIAGNOSIS — Z12.11 ENCOUNTER FOR SCREENING FOR MALIGNANT NEOPLASM OF COLON: ICD-10-CM

## 2025-01-11 DIAGNOSIS — Z00.00 ENCOUNTER FOR GENERAL ADULT MEDICAL EXAMINATION W/OUT ABNORMAL FINDINGS: ICD-10-CM

## 2025-01-11 PROCEDURE — 93000 ELECTROCARDIOGRAM COMPLETE: CPT | Mod: 59

## 2025-01-11 PROCEDURE — 99396 PREV VISIT EST AGE 40-64: CPT | Mod: 25

## 2025-01-13 ENCOUNTER — RX RENEWAL (OUTPATIENT)
Age: 55
End: 2025-01-13

## 2025-01-14 ENCOUNTER — APPOINTMENT (OUTPATIENT)
Dept: RADIOLOGY | Facility: CLINIC | Age: 55
End: 2025-01-14
Payer: MEDICAID

## 2025-01-14 ENCOUNTER — RESULT REVIEW (OUTPATIENT)
Age: 55
End: 2025-01-14

## 2025-01-14 ENCOUNTER — APPOINTMENT (OUTPATIENT)
Dept: MAMMOGRAPHY | Facility: CLINIC | Age: 55
End: 2025-01-14
Payer: MEDICAID

## 2025-01-14 DIAGNOSIS — N63.20 UNSPECIFIED LUMP IN THE LEFT BREAST, UNSPECIFIED QUADRANT: ICD-10-CM

## 2025-01-14 PROCEDURE — 77067 SCR MAMMO BI INCL CAD: CPT

## 2025-01-14 PROCEDURE — 77063 BREAST TOMOSYNTHESIS BI: CPT

## 2025-01-14 PROCEDURE — 73660 X-RAY EXAM OF TOE(S): CPT | Mod: RT

## 2025-01-15 DIAGNOSIS — S92.501A DISPLACED UNSPECIFIED FRACTURE OF RIGHT LESSER TOE(S), INITIAL ENCOUNTER FOR CLOSED FRACTURE: ICD-10-CM

## 2025-01-23 ENCOUNTER — APPOINTMENT (OUTPATIENT)
Age: 55
End: 2025-01-23
Payer: MEDICAID

## 2025-01-23 VITALS
OXYGEN SATURATION: 100 % | DIASTOLIC BLOOD PRESSURE: 77 MMHG | WEIGHT: 150 LBS | BODY MASS INDEX: 27.6 KG/M2 | HEIGHT: 62 IN | HEART RATE: 83 BPM | SYSTOLIC BLOOD PRESSURE: 133 MMHG

## 2025-01-23 DIAGNOSIS — S92.521A DISPLACED FRACTURE OF MIDDLE PHALANX OF RIGHT LESSER TOE(S), INITIAL ENCOUNTER FOR CLOSED FRACTURE: ICD-10-CM

## 2025-01-23 PROCEDURE — 99214 OFFICE O/P EST MOD 30 MIN: CPT

## 2025-01-24 ENCOUNTER — RX RENEWAL (OUTPATIENT)
Age: 55
End: 2025-01-24

## 2025-01-30 ENCOUNTER — APPOINTMENT (OUTPATIENT)
Dept: ENDOCRINOLOGY | Facility: CLINIC | Age: 55
End: 2025-01-30
Payer: MEDICAID

## 2025-01-30 VITALS
TEMPERATURE: 98 F | BODY MASS INDEX: 27.97 KG/M2 | WEIGHT: 152 LBS | OXYGEN SATURATION: 99 % | RESPIRATION RATE: 16 BRPM | HEART RATE: 83 BPM | DIASTOLIC BLOOD PRESSURE: 79 MMHG | SYSTOLIC BLOOD PRESSURE: 152 MMHG | HEIGHT: 62 IN

## 2025-01-30 DIAGNOSIS — E11.65 TYPE 2 DIABETES MELLITUS WITH HYPERGLYCEMIA: ICD-10-CM

## 2025-01-30 DIAGNOSIS — R80.9 PROTEINURIA, UNSPECIFIED: ICD-10-CM

## 2025-01-30 PROCEDURE — 82962 GLUCOSE BLOOD TEST: CPT

## 2025-01-30 PROCEDURE — 99214 OFFICE O/P EST MOD 30 MIN: CPT | Mod: 25

## 2025-01-30 RX ORDER — LOSARTAN POTASSIUM 50 MG/1
50 TABLET, FILM COATED ORAL
Qty: 90 | Refills: 3 | Status: DISCONTINUED | COMMUNITY
Start: 2025-01-24 | End: 2025-01-30

## 2025-02-01 LAB — GLUCOSE BLDC GLUCOMTR-MCNC: 210

## 2025-02-18 ENCOUNTER — TRANSCRIPTION ENCOUNTER (OUTPATIENT)
Age: 55
End: 2025-02-18

## 2025-02-20 ENCOUNTER — TRANSCRIPTION ENCOUNTER (OUTPATIENT)
Age: 55
End: 2025-02-20

## 2025-02-25 ENCOUNTER — APPOINTMENT (OUTPATIENT)
Dept: ULTRASOUND IMAGING | Facility: CLINIC | Age: 55
End: 2025-02-25
Payer: MEDICAID

## 2025-02-25 ENCOUNTER — RESULT REVIEW (OUTPATIENT)
Age: 55
End: 2025-02-25

## 2025-02-25 PROCEDURE — 76642 ULTRASOUND BREAST LIMITED: CPT | Mod: LT

## 2025-02-26 DIAGNOSIS — Z87.898 PERSONAL HISTORY OF OTHER SPECIFIED CONDITIONS: ICD-10-CM

## 2025-02-26 DIAGNOSIS — N63.20 UNSPECIFIED LUMP IN THE LEFT BREAST, UNSPECIFIED QUADRANT: ICD-10-CM

## 2025-03-05 ENCOUNTER — TRANSCRIPTION ENCOUNTER (OUTPATIENT)
Age: 55
End: 2025-03-05

## 2025-03-11 ENCOUNTER — RESULT REVIEW (OUTPATIENT)
Age: 55
End: 2025-03-11

## 2025-03-11 ENCOUNTER — APPOINTMENT (OUTPATIENT)
Dept: MAMMOGRAPHY | Facility: CLINIC | Age: 55
End: 2025-03-11
Payer: MEDICAID

## 2025-03-11 PROCEDURE — 77065 DX MAMMO INCL CAD UNI: CPT | Mod: LT

## 2025-03-11 PROCEDURE — 19081 BX BREAST 1ST LESION STRTCTC: CPT | Mod: LT

## 2025-03-11 PROCEDURE — A4648: CPT

## 2025-03-20 ENCOUNTER — TRANSCRIPTION ENCOUNTER (OUTPATIENT)
Age: 55
End: 2025-03-20

## 2025-04-02 ENCOUNTER — RX RENEWAL (OUTPATIENT)
Age: 55
End: 2025-04-02

## 2025-04-26 ENCOUNTER — RX RENEWAL (OUTPATIENT)
Age: 55
End: 2025-04-26

## 2025-05-01 ENCOUNTER — APPOINTMENT (OUTPATIENT)
Dept: ENDOCRINOLOGY | Facility: CLINIC | Age: 55
End: 2025-05-01
Payer: MEDICAID

## 2025-05-01 VITALS
DIASTOLIC BLOOD PRESSURE: 83 MMHG | BODY MASS INDEX: 28.89 KG/M2 | OXYGEN SATURATION: 97 % | RESPIRATION RATE: 16 BRPM | SYSTOLIC BLOOD PRESSURE: 143 MMHG | HEART RATE: 84 BPM | HEIGHT: 62 IN | TEMPERATURE: 97.4 F | WEIGHT: 157 LBS

## 2025-05-01 DIAGNOSIS — E11.65 TYPE 2 DIABETES MELLITUS WITH HYPERGLYCEMIA: ICD-10-CM

## 2025-05-01 DIAGNOSIS — E78.5 HYPERLIPIDEMIA, UNSPECIFIED: ICD-10-CM

## 2025-05-01 DIAGNOSIS — R80.9 PROTEINURIA, UNSPECIFIED: ICD-10-CM

## 2025-05-01 DIAGNOSIS — H35.00 UNSPECIFIED BACKGROUND RETINOPATHY: ICD-10-CM

## 2025-05-01 LAB — GLUCOSE BLDC GLUCOMTR-MCNC: 184

## 2025-05-01 PROCEDURE — 82962 GLUCOSE BLOOD TEST: CPT

## 2025-05-01 PROCEDURE — 99214 OFFICE O/P EST MOD 30 MIN: CPT | Mod: 25

## 2025-05-06 ENCOUNTER — TRANSCRIPTION ENCOUNTER (OUTPATIENT)
Age: 55
End: 2025-05-06

## 2025-05-06 RX ORDER — INSULIN ASPART 100 [IU]/ML
100 INJECTION, SOLUTION INTRAVENOUS; SUBCUTANEOUS
Qty: 1 | Refills: 1 | Status: ACTIVE | COMMUNITY
Start: 2025-05-01 | End: 1900-01-01

## 2025-05-24 ENCOUNTER — RX RENEWAL (OUTPATIENT)
Age: 55
End: 2025-05-24

## 2025-05-26 ENCOUNTER — RX RENEWAL (OUTPATIENT)
Age: 55
End: 2025-05-26

## 2025-05-26 RX ORDER — PEN NEEDLE, DIABETIC 32GX 5/32"
32G X 4 MM NEEDLE, DISPOSABLE MISCELLANEOUS
Qty: 1 | Refills: 0 | Status: ACTIVE | COMMUNITY
Start: 2025-05-26 | End: 1900-01-01

## 2025-05-29 ENCOUNTER — RX RENEWAL (OUTPATIENT)
Age: 55
End: 2025-05-29

## 2025-07-19 ENCOUNTER — APPOINTMENT (OUTPATIENT)
Dept: INTERNAL MEDICINE | Facility: CLINIC | Age: 55
End: 2025-07-19
Payer: MEDICAID

## 2025-07-19 VITALS
BODY MASS INDEX: 29.08 KG/M2 | SYSTOLIC BLOOD PRESSURE: 108 MMHG | DIASTOLIC BLOOD PRESSURE: 70 MMHG | HEART RATE: 83 BPM | WEIGHT: 158 LBS | TEMPERATURE: 97.6 F | HEIGHT: 62 IN | OXYGEN SATURATION: 98 % | RESPIRATION RATE: 16 BRPM

## 2025-07-19 PROCEDURE — 99213 OFFICE O/P EST LOW 20 MIN: CPT

## 2025-07-24 ENCOUNTER — NON-APPOINTMENT (OUTPATIENT)
Age: 55
End: 2025-07-24

## 2025-07-24 ENCOUNTER — APPOINTMENT (OUTPATIENT)
Dept: SURGICAL ONCOLOGY | Facility: CLINIC | Age: 55
End: 2025-07-24
Payer: MEDICAID

## 2025-07-24 VITALS
HEART RATE: 76 BPM | DIASTOLIC BLOOD PRESSURE: 78 MMHG | RESPIRATION RATE: 16 BRPM | BODY MASS INDEX: 29.08 KG/M2 | WEIGHT: 158 LBS | HEIGHT: 62 IN | OXYGEN SATURATION: 96 % | SYSTOLIC BLOOD PRESSURE: 134 MMHG

## 2025-07-24 DIAGNOSIS — I10 ESSENTIAL (PRIMARY) HYPERTENSION: ICD-10-CM

## 2025-07-24 PROCEDURE — 99205 OFFICE O/P NEW HI 60 MIN: CPT

## 2025-07-29 ENCOUNTER — OUTPATIENT (OUTPATIENT)
Dept: OUTPATIENT SERVICES | Facility: HOSPITAL | Age: 55
LOS: 1 days | End: 2025-07-29
Payer: MEDICAID

## 2025-07-29 VITALS
SYSTOLIC BLOOD PRESSURE: 157 MMHG | TEMPERATURE: 97 F | RESPIRATION RATE: 16 BRPM | OXYGEN SATURATION: 98 % | HEIGHT: 62 IN | HEART RATE: 82 BPM | WEIGHT: 158.07 LBS | DIASTOLIC BLOOD PRESSURE: 84 MMHG

## 2025-07-29 DIAGNOSIS — Z98.890 OTHER SPECIFIED POSTPROCEDURAL STATES: Chronic | ICD-10-CM

## 2025-07-29 DIAGNOSIS — K12.1 OTHER FORMS OF STOMATITIS: ICD-10-CM

## 2025-07-29 DIAGNOSIS — I10 ESSENTIAL (PRIMARY) HYPERTENSION: ICD-10-CM

## 2025-07-29 DIAGNOSIS — Z01.818 ENCOUNTER FOR OTHER PREPROCEDURAL EXAMINATION: ICD-10-CM

## 2025-07-29 DIAGNOSIS — E11.9 TYPE 2 DIABETES MELLITUS WITHOUT COMPLICATIONS: ICD-10-CM

## 2025-07-30 PROCEDURE — G0463: CPT

## 2025-08-04 ENCOUNTER — RESULT REVIEW (OUTPATIENT)
Age: 55
End: 2025-08-04

## 2025-08-04 ENCOUNTER — OUTPATIENT (OUTPATIENT)
Dept: OUTPATIENT SERVICES | Facility: HOSPITAL | Age: 55
LOS: 1 days | End: 2025-08-04
Payer: MEDICAID

## 2025-08-04 ENCOUNTER — TRANSCRIPTION ENCOUNTER (OUTPATIENT)
Age: 55
End: 2025-08-04

## 2025-08-04 ENCOUNTER — APPOINTMENT (OUTPATIENT)
Dept: SURGICAL ONCOLOGY | Facility: HOSPITAL | Age: 55
End: 2025-08-04

## 2025-08-04 VITALS
DIASTOLIC BLOOD PRESSURE: 68 MMHG | HEART RATE: 79 BPM | RESPIRATION RATE: 16 BRPM | OXYGEN SATURATION: 97 % | TEMPERATURE: 98 F | SYSTOLIC BLOOD PRESSURE: 144 MMHG

## 2025-08-04 VITALS
WEIGHT: 158.07 LBS | SYSTOLIC BLOOD PRESSURE: 147 MMHG | HEIGHT: 62 IN | DIASTOLIC BLOOD PRESSURE: 84 MMHG | RESPIRATION RATE: 16 BRPM | OXYGEN SATURATION: 99 % | TEMPERATURE: 98 F | HEART RATE: 99 BPM

## 2025-08-04 DIAGNOSIS — K12.1 OTHER FORMS OF STOMATITIS: ICD-10-CM

## 2025-08-04 DIAGNOSIS — Z98.890 OTHER SPECIFIED POSTPROCEDURAL STATES: Chronic | ICD-10-CM

## 2025-08-04 DIAGNOSIS — Z01.818 ENCOUNTER FOR OTHER PREPROCEDURAL EXAMINATION: ICD-10-CM

## 2025-08-04 LAB
GLUCOSE BLDC GLUCOMTR-MCNC: 156 MG/DL — HIGH (ref 70–99)
GLUCOSE BLDC GLUCOMTR-MCNC: 176 MG/DL — HIGH (ref 70–99)

## 2025-08-04 PROCEDURE — 40810 EXCISION OF MOUTH LESION: CPT

## 2025-08-04 PROCEDURE — 40812 EXCISE/REPAIR MOUTH LESION: CPT

## 2025-08-04 PROCEDURE — 82962 GLUCOSE BLOOD TEST: CPT

## 2025-08-04 RX ORDER — SITAGLIPTIN 100 MG/1
1 TABLET, FILM COATED ORAL
Refills: 0 | DISCHARGE

## 2025-08-04 RX ORDER — ONDANSETRON HCL/PF 4 MG/2 ML
4 VIAL (ML) INJECTION ONCE
Refills: 0 | Status: DISCONTINUED | OUTPATIENT
Start: 2025-08-04 | End: 2025-08-04

## 2025-08-04 RX ORDER — ACETAMINOPHEN 500 MG/5ML
1000 LIQUID (ML) ORAL ONCE
Refills: 0 | Status: DISCONTINUED | OUTPATIENT
Start: 2025-08-04 | End: 2025-08-04

## 2025-08-04 RX ORDER — LOSARTAN POTASSIUM 100 MG/1
1 TABLET, FILM COATED ORAL
Refills: 0 | DISCHARGE

## 2025-08-04 RX ORDER — ACETAMINOPHEN 500 MG/5ML
1000 LIQUID (ML) ORAL
Qty: 0 | Refills: 0 | DISCHARGE
Start: 2025-08-04

## 2025-08-04 RX ORDER — HEPARIN SODIUM 1000 [USP'U]/ML
5000 INJECTION INTRAVENOUS; SUBCUTANEOUS ONCE
Refills: 0 | Status: COMPLETED | OUTPATIENT
Start: 2025-08-04 | End: 2025-08-04

## 2025-08-04 RX ORDER — FENTANYL CITRATE-0.9 % NACL/PF 100MCG/2ML
25 SYRINGE (ML) INTRAVENOUS
Refills: 0 | Status: DISCONTINUED | OUTPATIENT
Start: 2025-08-04 | End: 2025-08-04

## 2025-08-04 RX ORDER — HYDROMORPHONE/SOD CHLOR,ISO/PF 2 MG/10 ML
0.5 SYRINGE (ML) INJECTION
Refills: 0 | Status: DISCONTINUED | OUTPATIENT
Start: 2025-08-04 | End: 2025-08-04

## 2025-08-04 RX ORDER — OXYCODONE HYDROCHLORIDE 30 MG/1
1 TABLET ORAL
Qty: 5 | Refills: 0
Start: 2025-08-04

## 2025-08-04 RX ORDER — INSULIN GLARGINE-YFGN 100 [IU]/ML
27 INJECTION, SOLUTION SUBCUTANEOUS
Refills: 0 | DISCHARGE

## 2025-08-04 RX ORDER — EMPAGLIFLOZIN 25 MG/1
1 TABLET, FILM COATED ORAL
Refills: 0 | DISCHARGE

## 2025-08-04 RX ORDER — OXYCODONE HYDROCHLORIDE 30 MG/1
5 TABLET ORAL ONCE
Refills: 0 | Status: DISCONTINUED | OUTPATIENT
Start: 2025-08-04 | End: 2025-08-04

## 2025-08-04 RX ORDER — ATORVASTATIN CALCIUM 80 MG/1
1 TABLET, FILM COATED ORAL
Refills: 0 | DISCHARGE

## 2025-08-12 ENCOUNTER — TRANSCRIPTION ENCOUNTER (OUTPATIENT)
Age: 55
End: 2025-08-12

## 2025-08-12 ENCOUNTER — APPOINTMENT (OUTPATIENT)
Dept: SURGICAL ONCOLOGY | Facility: CLINIC | Age: 55
End: 2025-08-12
Payer: MEDICAID

## 2025-08-12 VITALS
SYSTOLIC BLOOD PRESSURE: 115 MMHG | HEIGHT: 62 IN | BODY MASS INDEX: 29.08 KG/M2 | WEIGHT: 158 LBS | HEART RATE: 73 BPM | RESPIRATION RATE: 16 BRPM | OXYGEN SATURATION: 100 % | DIASTOLIC BLOOD PRESSURE: 67 MMHG

## 2025-08-12 VITALS
HEIGHT: 62 IN | WEIGHT: 158 LBS | OXYGEN SATURATION: 96 % | HEART RATE: 88 BPM | DIASTOLIC BLOOD PRESSURE: 83 MMHG | RESPIRATION RATE: 16 BRPM | BODY MASS INDEX: 29.08 KG/M2 | SYSTOLIC BLOOD PRESSURE: 138 MMHG

## 2025-08-12 DIAGNOSIS — K12.1 OTHER FORMS OF STOMATITIS: ICD-10-CM

## 2025-08-12 LAB — SURGICAL PATHOLOGY STUDY: SIGNIFICANT CHANGE UP

## 2025-08-12 PROCEDURE — 99024 POSTOP FOLLOW-UP VISIT: CPT

## 2025-08-13 ENCOUNTER — APPOINTMENT (OUTPATIENT)
Dept: ENDOCRINOLOGY | Facility: CLINIC | Age: 55
End: 2025-08-13
Payer: MEDICAID

## 2025-08-13 VITALS
SYSTOLIC BLOOD PRESSURE: 122 MMHG | TEMPERATURE: 98 F | HEART RATE: 83 BPM | RESPIRATION RATE: 16 BRPM | OXYGEN SATURATION: 95 % | HEIGHT: 62 IN | BODY MASS INDEX: 28.16 KG/M2 | DIASTOLIC BLOOD PRESSURE: 76 MMHG | WEIGHT: 153 LBS

## 2025-08-13 DIAGNOSIS — E11.65 TYPE 2 DIABETES MELLITUS WITH HYPERGLYCEMIA: ICD-10-CM

## 2025-08-13 DIAGNOSIS — R80.9 PROTEINURIA, UNSPECIFIED: ICD-10-CM

## 2025-08-13 DIAGNOSIS — H35.00 UNSPECIFIED BACKGROUND RETINOPATHY: ICD-10-CM

## 2025-08-13 LAB — GLUCOSE BLDC GLUCOMTR-MCNC: 147

## 2025-08-13 PROCEDURE — 82962 GLUCOSE BLOOD TEST: CPT

## 2025-08-13 PROCEDURE — 99214 OFFICE O/P EST MOD 30 MIN: CPT | Mod: 25

## 2025-08-25 ENCOUNTER — TRANSCRIPTION ENCOUNTER (OUTPATIENT)
Age: 55
End: 2025-08-25

## 2025-08-25 ENCOUNTER — RX RENEWAL (OUTPATIENT)
Age: 55
End: 2025-08-25

## 2025-08-26 ENCOUNTER — TRANSCRIPTION ENCOUNTER (OUTPATIENT)
Age: 55
End: 2025-08-26

## 2025-09-15 ENCOUNTER — TRANSCRIPTION ENCOUNTER (OUTPATIENT)
Age: 55
End: 2025-09-15

## 2025-09-15 ENCOUNTER — RX RENEWAL (OUTPATIENT)
Age: 55
End: 2025-09-15

## 2025-09-16 ENCOUNTER — APPOINTMENT (OUTPATIENT)
Dept: SURGICAL ONCOLOGY | Facility: CLINIC | Age: 55
End: 2025-09-16

## 2025-09-16 VITALS
HEIGHT: 62 IN | HEART RATE: 89 BPM | SYSTOLIC BLOOD PRESSURE: 150 MMHG | OXYGEN SATURATION: 95 % | RESPIRATION RATE: 16 BRPM | WEIGHT: 153 LBS | DIASTOLIC BLOOD PRESSURE: 77 MMHG | BODY MASS INDEX: 28.16 KG/M2

## 2025-09-16 DIAGNOSIS — K12.1 OTHER FORMS OF STOMATITIS: ICD-10-CM

## 2025-09-16 RX ORDER — CEFADROXIL 500 MG/1
500 CAPSULE ORAL
Qty: 20 | Refills: 3 | Status: ACTIVE | COMMUNITY
Start: 2025-09-16 | End: 1900-01-01

## 2025-09-17 ENCOUNTER — TRANSCRIPTION ENCOUNTER (OUTPATIENT)
Age: 55
End: 2025-09-17

## (undated) DEVICE — GLV 6.5 PROTEXIS (WHITE)

## (undated) DEVICE — Device

## (undated) DEVICE — GLV 8.5 PROTEXIS (WHITE)

## (undated) DEVICE — DRSG TELFA 3 X 8

## (undated) DEVICE — DRAPE C ARM UNIVERSAL

## (undated) DEVICE — VISITEC 4X4

## (undated) DEVICE — DRSG COMBINE 5X9"

## (undated) DEVICE — POSITIONER FOAM HEADREST (PINK)

## (undated) DEVICE — SOL IRR POUR H2O 500ML

## (undated) DEVICE — DRAPE 3/4 SHEET W REINFORCEMENT 56X77"

## (undated) DEVICE — DRILL BIT SYNTHES ORTHO QC 3.5X110MM

## (undated) DEVICE — TOURNIQUET CUFF 34" DUAL PORT W PLC

## (undated) DEVICE — PACK DENTAL MINOR

## (undated) DEVICE — GLV 7.5 PROTEXIS (WHITE)

## (undated) DEVICE — MEDICATION LABELS W MARKER

## (undated) DEVICE — DRAPE IOBAN 23" X 23"

## (undated) DEVICE — ELCTR GROUNDING PAD ADULT COVIDIEN

## (undated) DEVICE — DRAPE COVER DOME COVEX 27"

## (undated) DEVICE — BLADE SCALPEL SAFETYLOCK #10

## (undated) DEVICE — ELCTR ROCKER SWITCH PENCIL BLUE 10FT

## (undated) DEVICE — DRAPE U POLY BLUE 60"X60"

## (undated) DEVICE — STRYKER INTERPULSE HANDPIECE W IRR SUCTION TUBE

## (undated) DEVICE — DRSG CURITY GAUZE SPONGE 4 X 4" 12-PLY

## (undated) DEVICE — WARMING BLANKET UPPER ADULT

## (undated) DEVICE — SUCTION YANKAUER NO CONTROL VENT

## (undated) DEVICE — SOL IRR POUR NS 0.9% 500ML

## (undated) DEVICE — FOR-ESU VALLEYLAB T7E14848DX: Type: DURABLE MEDICAL EQUIPMENT

## (undated) DEVICE — SOL IRR POUR H2O 250ML

## (undated) DEVICE — DRSG ACE BANDAGE 4" NS

## (undated) DEVICE — POSITIONER FOAM EGG CRATE ULNAR 2PCS (PINK)

## (undated) DEVICE — VENODYNE/SCD SLEEVE CALF LARGE

## (undated) DEVICE — GLV 7.5 PROTEXIS (BLUE)

## (undated) DEVICE — PROTECTOR HEEL / ELBOW FLUFFY

## (undated) DEVICE — SUT CHROMIC 4-0 27" RB-1

## (undated) DEVICE — POSITIONER HEAD REST PRONE

## (undated) DEVICE — GLV 7 PROTEXIS (WHITE)

## (undated) DEVICE — LABELS BLANK W PEN

## (undated) DEVICE — GOWN TRIMAX LG

## (undated) DEVICE — SUT CHROMIC 3-0 27" RB-1

## (undated) DEVICE — VENODYNE/SCD SLEEVE CALF MEDIUM

## (undated) DEVICE — POSITIONER CUSHION INSERT PRONE VIEW LG

## (undated) DEVICE — DRSG WEBRIL 6"

## (undated) DEVICE — MARKING PEN W RULER

## (undated) DEVICE — SUT MONOSOF 3-0 18" C-14

## (undated) DEVICE — SUT VICRYL 4-0 27" RB-1 UNDYED

## (undated) DEVICE — GLV 8 PROTEXIS (WHITE)

## (undated) DEVICE — CANISTER DISPOSABLE THIN WALL 3000CC

## (undated) DEVICE — SOL IRR BAG NS 0.9% 3000ML

## (undated) DEVICE — SUT POLYSORB 2-0 30" GS-21 UNDYED

## (undated) DEVICE — DRSG STOCKINETTE IMPERVIOUS LG

## (undated) DEVICE — DRAPE 3/4 SHEET 52X76"

## (undated) DEVICE — POSITIONER CARDIAC BUMP

## (undated) DEVICE — DRILL BIT SYNTHES ORTHO QC 2.5X110MM

## (undated) DEVICE — DRAPE U 47X51" NON STERILE

## (undated) DEVICE — DRSG WEBRIL 4"

## (undated) DEVICE — PACK EXTREMITY